# Patient Record
Sex: MALE | Race: WHITE
[De-identification: names, ages, dates, MRNs, and addresses within clinical notes are randomized per-mention and may not be internally consistent; named-entity substitution may affect disease eponyms.]

---

## 2017-06-14 ENCOUNTER — APPOINTMENT (OUTPATIENT)
Dept: DERMATOLOGY | Facility: CLINIC | Age: 62
End: 2017-06-14

## 2017-06-14 VITALS — HEIGHT: 68 IN | WEIGHT: 185 LBS | BODY MASS INDEX: 28.04 KG/M2

## 2017-06-14 DIAGNOSIS — Z86.69 PERSONAL HISTORY OF OTHER DISEASES OF THE NERVOUS SYSTEM AND SENSE ORGANS: ICD-10-CM

## 2017-06-14 DIAGNOSIS — Z86.79 PERSONAL HISTORY OF OTHER DISEASES OF THE CIRCULATORY SYSTEM: ICD-10-CM

## 2017-06-14 RX ORDER — METOPROLOL TARTRATE 100 MG/1
100 TABLET, FILM COATED ORAL
Refills: 0 | Status: ACTIVE | COMMUNITY

## 2017-06-22 ENCOUNTER — EMERGENCY (EMERGENCY)
Facility: HOSPITAL | Age: 62
LOS: 0 days | Discharge: HOME | End: 2017-06-22
Attending: EMERGENCY MEDICINE | Admitting: EMERGENCY MEDICINE
Payer: COMMERCIAL

## 2017-06-22 VITALS
TEMPERATURE: 98 F | SYSTOLIC BLOOD PRESSURE: 117 MMHG | DIASTOLIC BLOOD PRESSURE: 85 MMHG | HEART RATE: 17 BPM | OXYGEN SATURATION: 100 % | RESPIRATION RATE: 17 BRPM

## 2017-06-22 VITALS — HEIGHT: 68 IN | WEIGHT: 184.97 LBS

## 2017-06-22 DIAGNOSIS — R07.9 CHEST PAIN, UNSPECIFIED: ICD-10-CM

## 2017-06-22 DIAGNOSIS — R07.89 OTHER CHEST PAIN: ICD-10-CM

## 2017-06-22 DIAGNOSIS — I48.91 UNSPECIFIED ATRIAL FIBRILLATION: ICD-10-CM

## 2017-06-22 DIAGNOSIS — Z79.82 LONG TERM (CURRENT) USE OF ASPIRIN: ICD-10-CM

## 2017-06-22 LAB
ALBUMIN SERPL ELPH-MCNC: 3.5 G/DL — SIGNIFICANT CHANGE UP (ref 3.3–5)
ALP SERPL-CCNC: 112 U/L — SIGNIFICANT CHANGE UP (ref 40–120)
ALT FLD-CCNC: 36 U/L — SIGNIFICANT CHANGE UP (ref 12–78)
ANION GAP SERPL CALC-SCNC: 6 MMOL/L — SIGNIFICANT CHANGE UP (ref 5–17)
ANISOCYTOSIS BLD QL: SLIGHT — SIGNIFICANT CHANGE UP
AST SERPL-CCNC: 33 U/L — SIGNIFICANT CHANGE UP (ref 15–37)
BASO STIPL BLD QL SMEAR: SLIGHT — SIGNIFICANT CHANGE UP
BASOPHILS # BLD AUTO: 0.1 K/UL — SIGNIFICANT CHANGE UP (ref 0–0.2)
BASOPHILS NFR BLD AUTO: 0.7 % — SIGNIFICANT CHANGE UP (ref 0–2)
BILIRUB SERPL-MCNC: 1 MG/DL — SIGNIFICANT CHANGE UP (ref 0.2–1.2)
BUN SERPL-MCNC: 25 MG/DL — HIGH (ref 7–23)
CALCIUM SERPL-MCNC: 8.3 MG/DL — LOW (ref 8.5–10.1)
CHLORIDE SERPL-SCNC: 109 MMOL/L — HIGH (ref 96–108)
CO2 SERPL-SCNC: 26 MMOL/L — SIGNIFICANT CHANGE UP (ref 22–31)
CREAT SERPL-MCNC: 0.98 MG/DL — SIGNIFICANT CHANGE UP (ref 0.5–1.3)
DACRYOCYTES BLD QL SMEAR: SIGNIFICANT CHANGE UP
ELLIPTOCYTES BLD QL SMEAR: SIGNIFICANT CHANGE UP
EOSINOPHIL # BLD AUTO: 0.5 K/UL — SIGNIFICANT CHANGE UP (ref 0–0.5)
EOSINOPHIL NFR BLD AUTO: 4.1 % — SIGNIFICANT CHANGE UP (ref 0–6)
GLUCOSE SERPL-MCNC: 96 MG/DL — SIGNIFICANT CHANGE UP (ref 70–99)
HCT VFR BLD CALC: 37.4 % — LOW (ref 39–50)
HGB BLD-MCNC: 11.9 G/DL — LOW (ref 13–17)
HYPOCHROMIA BLD QL: SLIGHT — SIGNIFICANT CHANGE UP
LYMPHOCYTES # BLD AUTO: 2.4 K/UL — SIGNIFICANT CHANGE UP (ref 1–3.3)
LYMPHOCYTES # BLD AUTO: 22 % — SIGNIFICANT CHANGE UP (ref 13–44)
MANUAL DIF COMMENT BLD-IMP: SIGNIFICANT CHANGE UP
MCHC RBC-ENTMCNC: 20.4 PG — LOW (ref 27–34)
MCHC RBC-ENTMCNC: 31.8 GM/DL — LOW (ref 32–36)
MCV RBC AUTO: 64.1 FL — LOW (ref 80–100)
MICROCYTES BLD QL: SIGNIFICANT CHANGE UP
MONOCYTES # BLD AUTO: 1.2 K/UL — HIGH (ref 0–0.9)
MONOCYTES NFR BLD AUTO: 10.4 % — SIGNIFICANT CHANGE UP (ref 2–14)
NEUTROPHILS # BLD AUTO: 6.9 K/UL — SIGNIFICANT CHANGE UP (ref 1.8–7.4)
NEUTROPHILS NFR BLD AUTO: 62.7 % — SIGNIFICANT CHANGE UP (ref 43–77)
OVALOCYTES BLD QL SMEAR: SLIGHT — SIGNIFICANT CHANGE UP
PLAT MORPH BLD: NORMAL — SIGNIFICANT CHANGE UP
PLATELET # BLD AUTO: 158 K/UL — SIGNIFICANT CHANGE UP (ref 150–400)
POIKILOCYTOSIS BLD QL AUTO: SIGNIFICANT CHANGE UP
POTASSIUM SERPL-MCNC: 3.7 MMOL/L — SIGNIFICANT CHANGE UP (ref 3.5–5.3)
POTASSIUM SERPL-SCNC: 3.7 MMOL/L — SIGNIFICANT CHANGE UP (ref 3.5–5.3)
PROT SERPL-MCNC: 6.3 GM/DL — SIGNIFICANT CHANGE UP (ref 6–8.3)
RBC # BLD: 5.84 M/UL — HIGH (ref 4.2–5.8)
RBC # FLD: 14 % — SIGNIFICANT CHANGE UP (ref 10.3–14.5)
RBC BLD AUTO: (no result)
SODIUM SERPL-SCNC: 141 MMOL/L — SIGNIFICANT CHANGE UP (ref 135–145)
TROPONIN I SERPL-MCNC: <0.015 NG/ML — SIGNIFICANT CHANGE UP (ref 0.01–0.04)
TROPONIN I SERPL-MCNC: <0.015 NG/ML — SIGNIFICANT CHANGE UP (ref 0.01–0.04)
WBC # BLD: 11.1 K/UL — HIGH (ref 3.8–10.5)
WBC # FLD AUTO: 11.1 K/UL — HIGH (ref 3.8–10.5)

## 2017-06-22 PROCEDURE — 93010 ELECTROCARDIOGRAM REPORT: CPT

## 2017-06-22 PROCEDURE — 71020: CPT | Mod: 26

## 2017-06-22 PROCEDURE — 99285 EMERGENCY DEPT VISIT HI MDM: CPT

## 2017-06-22 RX ORDER — SODIUM CHLORIDE 9 MG/ML
3 INJECTION INTRAMUSCULAR; INTRAVENOUS; SUBCUTANEOUS ONCE
Qty: 0 | Refills: 0 | Status: COMPLETED | OUTPATIENT
Start: 2017-06-22 | End: 2017-06-22

## 2017-06-22 RX ADMIN — SODIUM CHLORIDE 3 MILLILITER(S): 9 INJECTION INTRAMUSCULAR; INTRAVENOUS; SUBCUTANEOUS at 09:33

## 2017-06-22 NOTE — ED PROVIDER NOTE - OBJECTIVE STATEMENT
63 y/o male with PMHx of Afib, on ASA presents to the ED c/o B/L arm numbness and CP starting this morning. Pt was at work when CP started and he started getting diaphoretic and lightheaded. Pt states sx lasted a couple of seconds and has occurred several times. Pt states that he felt the blood wasn't going to his heart and straight to his brain. Pt states that last time this happened, he came to the ED and was told he had Afib. Pt was placed on Coumadin but has been noncompliant. Pt is currently asymptomatic. Denies SOB. No recent travel. Dr. Alvarez, cardio. 63 y/o male with PMHx of Afib, on ASA presents to the ED c/o B/L arm numbness and CP starting this morning. Pt was at work when CP started and he started getting diaphoretic and lightheaded. Pt states sx lasted a couple of seconds and has occurred several times. Pt states that he felt the blood wasn't going to his heart and straight to his brain. Pt states that last time this happened, he came to the ED and was told he had Afib. Pt was placed on Coumadin but has been noncompliant. Pt has not seen cardiologist in 4 years. Pt is currently asymptomatic. Denies SOB. No recent travel. Dr. Alvarez, cardio. 61 y/o male with PMHx of Afib, on ASA presents to the ED c/o B/L arm numbness and CP starting this morning. Pt was at work when CP started and he started getting diaphoretic and lightheaded. Pt states sx lasted a couple of seconds and has occurred several times intermittently over a few minutes lasting seconds. Pt states that he felt the blood wasn't going to his heart and straight to his brain. Pt states that last time this happened, he came to the ED and was told he had Afib. Pt was placed on Coumadin but has been noncompliant. Pt has not seen cardiologist in 4 years. Pt is currently asymptomatic. Denies SOB. No recent travel. Dr. Alvarez, cardio.

## 2017-06-22 NOTE — SBIRT NOTE. - NSSBIRTSERVICES_GEN_A_ED_FT
Provided SBIRT services: Full screen Negative. Positive reinforcement provided given patient currently within healthy guidelines. Education materials reviewed and given to patient.  AUDIT Score:  2  DAST Score:  0  Duration=  10 Minutes

## 2017-06-22 NOTE — ED PROVIDER NOTE - PROGRESS NOTE DETAILS
Lucero Weinberg on behalf of Attending Dr. [place]. Pt updated on lab results. Pt currently feels fine. Will d/c and f/u with cardiologist.

## 2017-06-22 NOTE — ED PROVIDER NOTE - MEDICAL DECISION MAKING DETAILS
here with seconds long intermittent CP with some LH this morning.  feels well now, no recurrence of symptoms here in the ER.  VSS, labs WNL, EKG WNL, NSR.  needs prompt cardiology followup and pt understands.  pt has stopped his coumadin and states he no longer wants to take it.  he should take at least a full ASA every day until he follows up with cardiology or PMD tomorrow do perhaps choose another anticoagulant for him.  again, pt expresses understanding

## 2017-07-14 ENCOUNTER — APPOINTMENT (OUTPATIENT)
Dept: DERMATOLOGY | Facility: CLINIC | Age: 62
End: 2017-07-14

## 2017-07-14 DIAGNOSIS — L70.0 ACNE VULGARIS: ICD-10-CM

## 2017-09-06 ENCOUNTER — APPOINTMENT (OUTPATIENT)
Dept: DERMATOLOGY | Facility: CLINIC | Age: 62
End: 2017-09-06
Payer: COMMERCIAL

## 2017-09-06 DIAGNOSIS — L30.1 DYSHIDROSIS [POMPHOLYX]: ICD-10-CM

## 2017-09-06 PROCEDURE — 99213 OFFICE O/P EST LOW 20 MIN: CPT

## 2017-10-24 ENCOUNTER — OUTPATIENT (OUTPATIENT)
Dept: OUTPATIENT SERVICES | Facility: HOSPITAL | Age: 62
LOS: 1 days | End: 2017-10-24
Payer: COMMERCIAL

## 2017-10-24 VITALS
HEART RATE: 72 BPM | OXYGEN SATURATION: 98 % | RESPIRATION RATE: 18 BRPM | TEMPERATURE: 97 F | DIASTOLIC BLOOD PRESSURE: 80 MMHG | SYSTOLIC BLOOD PRESSURE: 132 MMHG | WEIGHT: 184.97 LBS | HEIGHT: 68 IN

## 2017-10-24 VITALS — HEIGHT: 68 IN | WEIGHT: 184.97 LBS

## 2017-10-24 DIAGNOSIS — H26.9 UNSPECIFIED CATARACT: Chronic | ICD-10-CM

## 2017-10-24 DIAGNOSIS — Z98.890 OTHER SPECIFIED POSTPROCEDURAL STATES: Chronic | ICD-10-CM

## 2017-10-24 DIAGNOSIS — I48.91 UNSPECIFIED ATRIAL FIBRILLATION: ICD-10-CM

## 2017-10-24 DIAGNOSIS — Z01.810 ENCOUNTER FOR PREPROCEDURAL CARDIOVASCULAR EXAMINATION: ICD-10-CM

## 2017-10-24 LAB
ANION GAP SERPL CALC-SCNC: 10 MMOL/L — SIGNIFICANT CHANGE UP (ref 5–17)
APTT BLD: 27.6 SEC — SIGNIFICANT CHANGE UP (ref 27.5–37.4)
BLD GP AB SCN SERPL QL: SIGNIFICANT CHANGE UP
BUN SERPL-MCNC: 14 MG/DL — SIGNIFICANT CHANGE UP (ref 8–20)
CALCIUM SERPL-MCNC: 8.7 MG/DL — SIGNIFICANT CHANGE UP (ref 8.6–10.2)
CHLORIDE SERPL-SCNC: 102 MMOL/L — SIGNIFICANT CHANGE UP (ref 98–107)
CO2 SERPL-SCNC: 28 MMOL/L — SIGNIFICANT CHANGE UP (ref 22–29)
CREAT SERPL-MCNC: 0.86 MG/DL — SIGNIFICANT CHANGE UP (ref 0.5–1.3)
GLUCOSE SERPL-MCNC: 93 MG/DL — SIGNIFICANT CHANGE UP (ref 70–115)
HCT VFR BLD CALC: 38.4 % — LOW (ref 42–52)
HGB BLD-MCNC: 12.9 G/DL — LOW (ref 14–18)
INR BLD: 1.06 RATIO — SIGNIFICANT CHANGE UP (ref 0.88–1.16)
MCHC RBC-ENTMCNC: 19.4 PG — LOW (ref 27–31)
MCHC RBC-ENTMCNC: 33.6 G/DL — SIGNIFICANT CHANGE UP (ref 32–36)
MCV RBC AUTO: 57.7 FL — LOW (ref 80–94)
PLATELET # BLD AUTO: 212 K/UL — SIGNIFICANT CHANGE UP (ref 150–400)
POTASSIUM SERPL-MCNC: 4.5 MMOL/L — SIGNIFICANT CHANGE UP (ref 3.5–5.3)
POTASSIUM SERPL-SCNC: 4.5 MMOL/L — SIGNIFICANT CHANGE UP (ref 3.5–5.3)
PROTHROM AB SERPL-ACNC: 11.7 SEC — SIGNIFICANT CHANGE UP (ref 9.8–12.7)
RBC # BLD: 6.65 M/UL — HIGH (ref 4.6–6.2)
RBC # FLD: 16.1 % — HIGH (ref 11–15.6)
SODIUM SERPL-SCNC: 140 MMOL/L — SIGNIFICANT CHANGE UP (ref 135–145)
TYPE + AB SCN PNL BLD: SIGNIFICANT CHANGE UP
WBC # BLD: 12.3 K/UL — HIGH (ref 4.8–10.8)
WBC # FLD AUTO: 12.3 K/UL — HIGH (ref 4.8–10.8)

## 2017-10-24 PROCEDURE — 75574 CT ANGIO HRT W/3D IMAGE: CPT

## 2017-10-24 PROCEDURE — 93010 ELECTROCARDIOGRAM REPORT: CPT

## 2017-10-24 PROCEDURE — 75574 CT ANGIO HRT W/3D IMAGE: CPT | Mod: 26

## 2017-10-24 NOTE — H&P PST ADULT - ATTENDING COMMENTS
Pt. seen and examined with no change in clinical status since PST or MANDO yesterday.  MANDO revealed no SHANAE thrombus and PFO

## 2017-10-24 NOTE — H&P PST ADULT - ASSESSMENT
62 year old male patient with a history of hypertension and paroxysmal atrial fibrillation, CHADSVASC: 1 who presents for elective ablation.     - NPO post midnight day of the procedure  - MANDO in the office on day prior to ablation.   - Lovenox injection will be given in Dr. Dudley's office on day of MANDO  - CTA ordered and will be performed. 62 year old male patient with a history of hypertension and paroxysmal atrial fibrillation, CHADSVASC: 1 who presents for elective ablation.     - NPO post midnight day of the procedure  - MANDO in the office on day prior to ablation.   - Lovenox injection will be given in Dr. Dudley's office on day of MANDO  - CTA ordered and will be performed today. 62 year old male patient with a history of hypertension and paroxysmal atrial fibrillation, CHADSVASC: 1 who presents for elective ablation.     - NPO post midnight day of the procedure  - MANDO in the office on day prior to ablation.   - Lovenox injection will be given in Dr. Dudley's office on day of MANDO  - CTA ordered and will be performed today.     **WBC elevated on PST. Will repeat CBC day of procedure**

## 2017-10-24 NOTE — H&P PST ADULT - HISTORY OF PRESENT ILLNESS
62 year old male patient with a history of hypertension, and very symptomatic atrial fibrillation. He reports his palpitations have been occurring sporadically but lately have gotten much worse and are happening daily. He also says that his medication dosage keeps getting larger as a result of this.      Echo: 7/25/17: EF 67%, LA 3.6cm 62 year old male patient with a history of hypertension, and very symptomatic atrial fibrillation. He reports his palpitations have been occurring sporadically but lately have gotten much worse and are happening daily. He also says that his medication dosage keeps getting larger as a result of this. Onset of symptoms was around 6 years ago while at work. Symptoms have gotten worse around one month ago.      Echo: 7/25/17: EF 67%, LA 3.6cm

## 2017-10-26 ENCOUNTER — INPATIENT (INPATIENT)
Facility: HOSPITAL | Age: 62
LOS: 0 days | Discharge: HOME | DRG: 274 | End: 2017-10-27
Attending: INTERNAL MEDICINE | Admitting: INTERNAL MEDICINE
Payer: COMMERCIAL

## 2017-10-26 ENCOUNTER — TRANSCRIPTION ENCOUNTER (OUTPATIENT)
Age: 62
End: 2017-10-26

## 2017-10-26 VITALS
TEMPERATURE: 98 F | RESPIRATION RATE: 16 BRPM | DIASTOLIC BLOOD PRESSURE: 79 MMHG | SYSTOLIC BLOOD PRESSURE: 149 MMHG | HEART RATE: 72 BPM | OXYGEN SATURATION: 98 %

## 2017-10-26 DIAGNOSIS — Z98.890 OTHER SPECIFIED POSTPROCEDURAL STATES: Chronic | ICD-10-CM

## 2017-10-26 DIAGNOSIS — I48.1 PERSISTENT ATRIAL FIBRILLATION: ICD-10-CM

## 2017-10-26 DIAGNOSIS — H26.9 UNSPECIFIED CATARACT: Chronic | ICD-10-CM

## 2017-10-26 DIAGNOSIS — Z01.810 ENCOUNTER FOR PREPROCEDURAL CARDIOVASCULAR EXAMINATION: ICD-10-CM

## 2017-10-26 LAB
ABO RH CONFIRMATION: SIGNIFICANT CHANGE UP
HCT VFR BLD CALC: 37.8 % — LOW (ref 42–52)
HGB BLD-MCNC: 12.6 G/DL — LOW (ref 14–18)
MCHC RBC-ENTMCNC: 19.3 PG — LOW (ref 27–31)
MCHC RBC-ENTMCNC: 33.3 G/DL — SIGNIFICANT CHANGE UP (ref 32–36)
MCV RBC AUTO: 57.8 FL — LOW (ref 80–94)
PLATELET # BLD AUTO: 179 K/UL — SIGNIFICANT CHANGE UP (ref 150–400)
RBC # BLD: 6.54 M/UL — HIGH (ref 4.6–6.2)
RBC # FLD: 16 % — HIGH (ref 11–15.6)
WBC # BLD: 9.7 K/UL — SIGNIFICANT CHANGE UP (ref 4.8–10.8)
WBC # FLD AUTO: 9.7 K/UL — SIGNIFICANT CHANGE UP (ref 4.8–10.8)

## 2017-10-26 PROCEDURE — 85027 COMPLETE CBC AUTOMATED: CPT

## 2017-10-26 PROCEDURE — 86900 BLOOD TYPING SEROLOGIC ABO: CPT

## 2017-10-26 PROCEDURE — 86901 BLOOD TYPING SEROLOGIC RH(D): CPT

## 2017-10-26 PROCEDURE — 86920 COMPATIBILITY TEST SPIN: CPT

## 2017-10-26 PROCEDURE — 85610 PROTHROMBIN TIME: CPT

## 2017-10-26 PROCEDURE — 36415 COLL VENOUS BLD VENIPUNCTURE: CPT

## 2017-10-26 PROCEDURE — 93010 ELECTROCARDIOGRAM REPORT: CPT

## 2017-10-26 PROCEDURE — 93005 ELECTROCARDIOGRAM TRACING: CPT

## 2017-10-26 PROCEDURE — 80048 BASIC METABOLIC PNL TOTAL CA: CPT

## 2017-10-26 PROCEDURE — 86850 RBC ANTIBODY SCREEN: CPT

## 2017-10-26 PROCEDURE — G0463: CPT

## 2017-10-26 PROCEDURE — 85730 THROMBOPLASTIN TIME PARTIAL: CPT

## 2017-10-26 RX ORDER — FENTANYL CITRATE 50 UG/ML
50 INJECTION INTRAVENOUS ONCE
Qty: 0 | Refills: 0 | Status: DISCONTINUED | OUTPATIENT
Start: 2017-10-26 | End: 2017-10-26

## 2017-10-26 RX ORDER — ACETAMINOPHEN 500 MG
650 TABLET ORAL EVERY 6 HOURS
Qty: 0 | Refills: 0 | Status: DISCONTINUED | OUTPATIENT
Start: 2017-10-26 | End: 2017-10-27

## 2017-10-26 RX ORDER — COLCHICINE 0.6 MG
0.6 TABLET ORAL DAILY
Qty: 0 | Refills: 0 | Status: DISCONTINUED | OUTPATIENT
Start: 2017-10-26 | End: 2017-10-27

## 2017-10-26 RX ORDER — METOPROLOL TARTRATE 50 MG
100 TABLET ORAL DAILY
Qty: 0 | Refills: 0 | Status: DISCONTINUED | OUTPATIENT
Start: 2017-10-26 | End: 2017-10-27

## 2017-10-26 RX ORDER — APIXABAN 2.5 MG/1
5 TABLET, FILM COATED ORAL EVERY 12 HOURS
Qty: 0 | Refills: 0 | Status: DISCONTINUED | OUTPATIENT
Start: 2017-10-26 | End: 2017-10-27

## 2017-10-26 RX ORDER — ASPIRIN/CALCIUM CARB/MAGNESIUM 324 MG
81 TABLET ORAL DAILY
Qty: 0 | Refills: 0 | Status: DISCONTINUED | OUTPATIENT
Start: 2017-10-26 | End: 2017-10-27

## 2017-10-26 RX ORDER — OXYCODONE AND ACETAMINOPHEN 5; 325 MG/1; MG/1
2 TABLET ORAL EVERY 6 HOURS
Qty: 0 | Refills: 0 | Status: DISCONTINUED | OUTPATIENT
Start: 2017-10-26 | End: 2017-10-27

## 2017-10-26 RX ORDER — SODIUM CHLORIDE 9 MG/ML
1000 INJECTION INTRAMUSCULAR; INTRAVENOUS; SUBCUTANEOUS
Qty: 0 | Refills: 0 | Status: DISCONTINUED | OUTPATIENT
Start: 2017-10-26 | End: 2017-10-27

## 2017-10-26 RX ORDER — PANTOPRAZOLE SODIUM 20 MG/1
40 TABLET, DELAYED RELEASE ORAL
Qty: 0 | Refills: 0 | Status: DISCONTINUED | OUTPATIENT
Start: 2017-10-26 | End: 2017-10-27

## 2017-10-26 RX ORDER — METOCLOPRAMIDE HCL 10 MG
10 TABLET ORAL EVERY 6 HOURS
Qty: 0 | Refills: 0 | Status: DISCONTINUED | OUTPATIENT
Start: 2017-10-26 | End: 2017-10-27

## 2017-10-26 RX ADMIN — OXYCODONE AND ACETAMINOPHEN 2 TABLET(S): 5; 325 TABLET ORAL at 14:49

## 2017-10-26 RX ADMIN — APIXABAN 5 MILLIGRAM(S): 2.5 TABLET, FILM COATED ORAL at 17:13

## 2017-10-26 RX ADMIN — OXYCODONE AND ACETAMINOPHEN 2 TABLET(S): 5; 325 TABLET ORAL at 13:22

## 2017-10-26 RX ADMIN — FENTANYL CITRATE 50 MICROGRAM(S): 50 INJECTION INTRAVENOUS at 15:45

## 2017-10-26 RX ADMIN — Medication 0.6 MILLIGRAM(S): at 16:56

## 2017-10-26 RX ADMIN — OXYCODONE AND ACETAMINOPHEN 2 TABLET(S): 5; 325 TABLET ORAL at 23:30

## 2017-10-26 RX ADMIN — FENTANYL CITRATE 50 MICROGRAM(S): 50 INJECTION INTRAVENOUS at 16:56

## 2017-10-26 RX ADMIN — Medication 81 MILLIGRAM(S): at 18:26

## 2017-10-26 RX ADMIN — OXYCODONE AND ACETAMINOPHEN 2 TABLET(S): 5; 325 TABLET ORAL at 22:41

## 2017-10-26 NOTE — DISCHARGE NOTE ADULT - HOSPITAL COURSE
62 year old male patient with a history of hypertension and symptomatic paroxysmal atrial fibrillation with episodes recently increasing in frequency and duration (CHADSVASC = 1). He presented electively 10/26/17 and underwent uncomplicated radiofrequency ablation (WACA/PVI, CTI line). The patient was observed overnight without event and was discharged home the following morning with a plan for outpatient follow up.

## 2017-10-26 NOTE — DISCHARGE NOTE ADULT - CARE PROVIDER_API CALL
Adis Dudley), Cardiovascular Disease; Internal Medicine  [address] [place] [address]  Phone: [phone]  Fax: [phone]

## 2017-10-26 NOTE — PROGRESS NOTE ADULT - ASSESSMENT
61 yo male, PMHx HTN, symptomatic paroxysmal atrial fibrillation, now POD #0 s/p afib cryoablation.     PLAN:  - Continuous cardiac monitoring for arrhythmias, monitor for development of chest pain  - Monitor hemodynamic status via arterial line for signs of post-op complications including cardiac tamponade  - Maintain bedrest until stopcock and sutures removed, and venostasis has been achieved  - Continue Eliquis for AC 5mg BID and ASA   - PPI and Colchicine for post-op prophylaxis  - Analgesics PRN, encourage use of non-narcotics as tolerated  - Restarted on home antihypertensive agent  - F/u AM EKG, BMP, Mg, Phos. Replace electrolytes as needed for goal K > 4 and Mg > 2 63 yo male, PMHx HTN, symptomatic paroxysmal atrial fibrillation, POD#0 s/p elective atrial fibrillation ablation    PLAN:  - No active bleeding of groin site at this time, will continue to monitor with serial physical exams now that patient has started on anticoagulation  - Anticoagulation with Eliquis 5mg BID and ASA 81mg QD  - Continuous cardiac monitoring overnight for development of arrhythmias  - Reglan PRN n/v  - PPI and Colchicine for post-procedural prophylaxis  - Analgesics PRN, encourage non-narcotics for pain as tolerated  - Metoprolol ER 100mg QD for hypertension, BP presently controlled, continue to monitor  - Ambien 5mg at patient's request for insomnia  - F/u AM labs and EKG, replace electrolytes as needed with goal Mg > 2 and K > 4

## 2017-10-26 NOTE — PROGRESS NOTE ADULT - SUBJECTIVE AND OBJECTIVE BOX
Patient is a 62y old  Male who presents with a chief complaint of Afib ablation (26 Oct 2017 13:06)      BRIEF HOSPITAL COURSE: 61 yo male, PMHx HTN, symptomatic paroxysmal atrial fibrillation refractory to medical therapy. MANDO prior to ablation negative for SHANAE thrombus. Echo 7/25/17: EF 67%, LA 3.6cm    Events last 24 hours: POD #0 s/p elective AFib cryoablation, with groin stopcock and sutures remain in place. Right rad A-line. Started on anticoagulation with Eliquis. Denies chest pain or shortness of breath. Evaluated in bed, eating dinner.    PAST MEDICAL & SURGICAL HISTORY:  Palpitations  Chest pain  HTN (hypertension)  PAC (premature atrial contraction)  Afib  H/O: knee surgery: bilateral  Cataract      Review of Systems:  CONSTITUTIONAL: No fever, chills, or fatigue  EYES: No eye pain, visual disturbances, or discharge  ENMT:  No difficulty hearing, tinnitus, vertigo; No sinus or throat pain  NECK: No pain or stiffness  RESPIRATORY: No cough, wheezing, chills or hemoptysis; No shortness of breath  CARDIOVASCULAR: No chest pain, palpitations, dizziness, or leg swelling  GASTROINTESTINAL: No abdominal or epigastric pain. No nausea, vomiting, or hematemesis; No diarrhea or constipation. No melena or hematochezia.  GENITOURINARY: No dysuria, frequency, hematuria, or incontinence  NEUROLOGICAL: No headaches, memory loss, loss of strength, numbness, or tremors  SKIN: No itching, burning, rashes, or lesions   MUSCULOSKELETAL: No joint pain or swelling; No muscle, back, or extremity pain  PSYCHIATRIC: No depression, anxiety, mood swings, or difficulty sleeping      Medications:  metoprolol succinate  milliGRAM(s) Oral daily  acetaminophen   Tablet. 650 milliGRAM(s) Oral every 6 hours PRN  metoclopramide Injectable 10 milliGRAM(s) IV Push every 6 hours PRN  oxyCODONE    5 mG/acetaminophen 325 mG 2 Tablet(s) Oral every 6 hours PRN  apixaban 5 milliGRAM(s) Oral every 12 hours  aspirin enteric coated 81 milliGRAM(s) Oral daily  pantoprazole    Tablet 40 milliGRAM(s) Oral before breakfast  colchicine 0.6 milliGRAM(s) Oral daily  sodium chloride 0.9%. 1000 milliLiter(s) IV Continuous <Continuous>      ICU Vital Signs Last 24 Hrs  T(C): 36.7 (26 Oct 2017 07:34), Max: 36.7 (26 Oct 2017 07:34)  T(F): 98 (26 Oct 2017 07:34), Max: 98 (26 Oct 2017 07:34)  HR: 93 (26 Oct 2017 22:39) (72 - 100)  BP: 139/85 (26 Oct 2017 16:50) (121/75 - 149/79)  BP(mean): --  ABP: 141/71 (26 Oct 2017 22:39) (114/69 - 143/80)  ABP(mean): 98 (26 Oct 2017 22:39) (84 - 101)  RR: 15 (26 Oct 2017 22:39) (14 - 20)  SpO2: 95% (26 Oct 2017 22:39) (93% - 99%)          I&O's Detail    26 Oct 2017 07:01  -  26 Oct 2017 22:47  --------------------------------------------------------  IN:  Total IN: 0 mL    OUT:    Indwelling Catheter - Urethral: 750 mL  Total OUT: 750 mL    Total NET: -750 mL            LABS:                        12.6   9.7   )-----------( 179      ( 26 Oct 2017 07:59 )             37.8                 CAPILLARY BLOOD GLUCOSE            CULTURES:      Physical Examination:    General: Well appearing male lying in bed in no acute distress.      HEENT: Pupils equal, reactive to light.  Symmetric.    PULM: Clear to auscultation bilaterally, no significant sputum production    CVS: Regular rate and rhythm, no murmurs, rubs, or gallops    ABD: Soft, nondistended, nontender, normoactive bowel sounds, no masses    EXT: No edema, nontender    SKIN: Warm and well perfused, no rashes noted.    NEURO: Alert, oriented, interactive, nonfocal    RADIOLOGY:   < from: CT Angio Cardiac w/ IV Cont (10.24.17 @ 12:38) >     EXAM:  CT ANGIO HEART W CORONARIES IC                          PROCEDURE DATE:  10/24/2017      INTERPRETATION:  History: 62 year old male with a history of atrial   fibrillation being evaluated for pulmonary vein and left atrial appendage   mapping.    Procedure: Informed consent was obtained from the patient and there were   no complications during the procedure. Utilizing bolus tracking,  80 cc's   of Omnipaque 350 was injected for CT angiography of the chest..  An   additional non gated CT of the heart was immediately performed.  Axial   two dimensional and three-dimensional images were reconstructed using an   analysis workstation. Study quality is satisfactory.    FINDINGS:    VISUALIZED LUNGS: clear:  MEDIASTINUM:  no significant mediastinal adenopathy.  BONES:  grossly unremarkable   AORTA: No thoracic aortic dissection is noted in the visualized thoracic   aorta.  PULMONARY ARTERIES: No pulmonary embolus is noted within the visualized   central pulmonary arteries.  PERICARDIUM:  normal     There is a calcification in the LAD. This study was not tailored to   evaluate the coronary arteries.      LEFT ATRIAL APPENDAGE:  Morphology: windsock  Size of ostium: 2.7 x 1.5cm.  Shape of ostium: oval  Complete opacification : Yes      There is common pulmonary venous anatomy.    The left superior pulmonary vein measures 1.3 x 1.2 cm. The left inferior   pulmonary vein measures 1.6 x 1.0 cm.    The right superior pulmonary vein measures 1.6 x 1.3 cm The inferior    pulmonary vein measures 2.3 x 1.9 cm    The esophagus is directly posterior to the left atrium.    IMPRESSION:     Pulmonary vein anatomy as described    PAULINA SPRAGUE M.D., ATTENDING RADIOLOGIST  This document has been electronically signed. Oct 25 2017  8:32AM        I have spent 34 minutes evaluating and treating this patient, including reviewing charts, labs, imagining studies, and collaborating with interdisciplinary team. Patient is a 62y old  Male who presents with a chief complaint of Afib ablation (26 Oct 2017 13:06)      BRIEF HOSPITAL COURSE: 61 yo male, PMHx HTN, symptomatic paroxysmal atrial fibrillation refractory to medical therapy. MANDO prior to ablation negative for SHANAE thrombus. Echo 7/25/17: EF 67%, LA 3.6cm    Events last 24 hours: POD#0 from AFib ablation. Pt evaluated sitting up in chair out of bed, denies any chest pain, syncope, or shortness of breath. Had sutures and stopcock removed with some oozing from site, denies leg pain, swelling, or paresthesias. Ambulated around unit without difficulty. Started on Xarelto.    PAST MEDICAL & SURGICAL HISTORY:  Palpitations  Chest pain  HTN (hypertension)  PAC (premature atrial contraction)  Afib  H/O: knee surgery: bilateral  Cataract      Review of Systems:  CONSTITUTIONAL: No fever, chills, or fatigue  EYES: No eye pain, visual disturbances, or discharge  ENMT:  No difficulty hearing, tinnitus, vertigo; No sinus or throat pain  NECK: No pain or stiffness  RESPIRATORY: No cough, wheezing, chills or hemoptysis; No shortness of breath  CARDIOVASCULAR: No chest pain, palpitations, dizziness, or leg swelling  GASTROINTESTINAL: No abdominal or epigastric pain. No nausea, vomiting, or hematemesis; No diarrhea or constipation. No melena or hematochezia.  GENITOURINARY: No dysuria, frequency, hematuria, or incontinence  NEUROLOGICAL: No headaches, memory loss, loss of strength, numbness, or tremors  SKIN: No itching, burning, rashes, or lesions   MUSCULOSKELETAL: No joint pain or swelling; No muscle, back, or extremity pain  PSYCHIATRIC: No depression, anxiety, mood swings, or difficulty sleeping      Medications:  metoprolol succinate  milliGRAM(s) Oral daily  acetaminophen   Tablet. 650 milliGRAM(s) Oral every 6 hours PRN  metoclopramide Injectable 10 milliGRAM(s) IV Push every 6 hours PRN  oxyCODONE    5 mG/acetaminophen 325 mG 2 Tablet(s) Oral every 6 hours PRN  zolpidem 5 milliGRAM(s) Oral once  apixaban 5 milliGRAM(s) Oral every 12 hours  aspirin enteric coated 81 milliGRAM(s) Oral daily  pantoprazole    Tablet 40 milliGRAM(s) Oral before breakfast  colchicine 0.6 milliGRAM(s) Oral daily  sodium chloride 0.9%. 1000 milliLiter(s) IV Continuous <Continuous>      ICU Vital Signs Last 24 Hrs  T(C): 36.7 (26 Oct 2017 07:34), Max: 36.7 (26 Oct 2017 07:34)  T(F): 98 (26 Oct 2017 07:34), Max: 98 (26 Oct 2017 07:34)  HR: 93 (26 Oct 2017 23:00) (72 - 100)  BP: 139/85 (26 Oct 2017 16:50) (121/75 - 149/79)  BP(mean): --  ABP: 140/70 (26 Oct 2017 23:00) (114/69 - 143/80)  ABP(mean): 98 (26 Oct 2017 23:00) (84 - 101)  RR: 15 (26 Oct 2017 23:00) (14 - 20)  SpO2: 96% (26 Oct 2017 23:00) (93% - 99%)          I&O's Detail    26 Oct 2017 07:01  -  27 Oct 2017 00:59  --------------------------------------------------------  IN:  Total IN: 0 mL    OUT:    Indwelling Catheter - Urethral: 750 mL  Total OUT: 750 mL    Total NET: -750 mL            LABS:                        12.6   9.7   )-----------( 179      ( 26 Oct 2017 07:59 )             37.8                 CAPILLARY BLOOD GLUCOSE            CULTURES:      Physical Examination:    General: No acute distress.      HEENT: Pupils equal, reactive to light.  Symmetric.    PULM: Clear to auscultation bilaterally, no significant sputum production    CVS: Regular rate and rhythm, no murmurs, rubs, or gallops    ABD: Soft, nondistended, nontender, normoactive bowel sounds, no masses    EXT: No edema, nontender. No groin hematoma    SKIN: Warm and well perfused, no rashes noted.    NEURO: Alert, oriented, interactive, nonfocal      I have spent 20 minutes evaluating and treating this patient, including reviewing charts, labs, imagining studies, and collaborating with interdisciplinary team.

## 2017-10-26 NOTE — DISCHARGE NOTE ADULT - INSTRUCTIONS
Choose lean meats and poultry without skin and prepare them without added saturated and trans fat.  Eat fish at least twice a week. Recent research shows that eating oily fish containing omega-3 fatty acids (for example, salmon, trout and herring) may help lower your risk of death from coronary artery disease.  Select fat-free, 1 percent fat and low-fat dairy products.  Cut back on foods containing partially hydrogenated vegetable oils to reduce trans fat in your diet.   To lower cholesterol, reduce saturated fat to no more than 5 to 6 percent of total calories. For someone eating 2,000 calories a day, that’s about 13 grams of saturated fat.  Cut back on beverages and foods with added sugars.  Choose and prepare foods with little or no salt. To lower blood pressure, aim to eat no more than 2,400 milligrams of sodium per day. Reducing daily intake to 1,500 mg is desirable because it can lower blood pressure even further.  If you drink alcohol, drink in moderation. That means one drink per day if you’re a woman and two drinks  per day if you’re a man.  Follow the American Heart Association recommendations when you eat out, and keep an eye on your portion sizes. remove dressings before showering

## 2017-10-26 NOTE — PROGRESS NOTE ADULT - SUBJECTIVE AND OBJECTIVE BOX
Subjective:  62y  male s/p successful afib ablation with Dr Dudley.     PAST MEDICAL & SURGICAL HISTORY:  Palpitations  Chest pain  HTN (hypertension)  PAC (premature atrial contraction)  Afib  H/O: knee surgery: bilateral  Cataract    No Known Allergies    FAMILY HISTORY: Not applicable    MEDICATIONS  (STANDING):  apixaban 5 milliGRAM(s) Oral every 12 hours  aspirin enteric coated 81 milliGRAM(s) Oral daily  colchicine 0.6 milliGRAM(s) Oral daily  metoprolol succinate  milliGRAM(s) Oral daily  pantoprazole    Tablet 40 milliGRAM(s) Oral before breakfast  sodium chloride 0.9%. 1000 milliLiter(s) (30 mL/Hr) IV Continuous <Continuous>    MEDICATIONS  (PRN):  acetaminophen   Tablet. 650 milliGRAM(s) Oral every 6 hours PRN Moderate Pain (4 - 6)  oxyCODONE    5 mG/acetaminophen 325 mG 2 Tablet(s) Oral every 6 hours PRN Severe Pain (7 - 10)      General: No fatigue, no fevers/chills  Respiratory: No dyspnea, no cough, no wheeze  CV: No chest pain, no palpitations  Abd: No nausea  Neuro: No headache, no dizziness      Objective:  Vital Signs Last 24 Hrs  T(C): 36.7 (26 Oct 2017 07:34), Max: 36.7 (26 Oct 2017 07:34)  T(F): 98 (26 Oct 2017 07:34), Max: 98 (26 Oct 2017 07:34)  HR: 94 (26 Oct 2017 13:45) (72 - 100)  BP: 121/75 (26 Oct 2017 12:10) (121/75 - 149/79)  BP(mean): --  RR: 16 (26 Oct 2017 13:45) (15 - 20)  SpO2: 98% (26 Oct 2017 13:45) (93% - 98%)  NEURO: A & O x 3, no focal neurologic deficits  PULM:  CTA B/L No W/R/R  CARD: RRR, +S1, +S2, No M/R/G  ABD: ND, +BS, NT, no masses  EXT: upper - Warm, pedal edema yes/no, pitting/non-pitting;  lower - Warm, pedal edema yes/no, pitting/non-pitting  PULSES:  R	- ***A line      FEM: DP                          P      Labs:                        12.6   9.7   )-----------( 179      ( 26 Oct 2017 07:59 )             37.8                 A/P:  S/P  Cryo AF ablation The patient underwnt MANDO prior to ablation revealing no intra-atrial thrombus    -  Admit to ICU level bed    -  Bedrest for 6 hours    -  Start anticoagulation with eliquis 5 mg BID    -  Continue prior home medications/ Aspirin 81 mg daily for 1 month     -  Reglan 10 mg IV every 6 hours as needed for nausea/vomiting.   Acetaminophen 650 mg every 6 hours as needed for moderate pain, Percocet 2 tabs every 6 hours prn severe pain.    7. Protonix 40mg daily for 3 months and colchicine 0.6mg daily for 3 months    8. EKG, CBC, BMP, and Mg in AM    9. Follow up with Dr. Dudley  in 2-4 weeks    10. Remove  groin suture in am prior to discharge Subjective:  62y  male s/p successful afib ablation with Dr Dudley.     PAST MEDICAL & SURGICAL HISTORY:  Palpitations  Chest pain  HTN (hypertension)  PAC (premature atrial contraction)  Afib  H/O: knee surgery: bilateral  Cataract    No Known Allergies    FAMILY HISTORY: Not applicable    MEDICATIONS  (STANDING):  apixaban 5 milliGRAM(s) Oral every 12 hours  aspirin enteric coated 81 milliGRAM(s) Oral daily  colchicine 0.6 milliGRAM(s) Oral daily  metoprolol succinate  milliGRAM(s) Oral daily  pantoprazole    Tablet 40 milliGRAM(s) Oral before breakfast  sodium chloride 0.9%. 1000 milliLiter(s) (30 mL/Hr) IV Continuous <Continuous>    MEDICATIONS  (PRN):  acetaminophen   Tablet. 650 milliGRAM(s) Oral every 6 hours PRN Moderate Pain (4 - 6)  oxyCODONE    5 mG/acetaminophen 325 mG 2 Tablet(s) Oral every 6 hours PRN Severe Pain (7 - 10)      General: No fatigue, no fevers/chills  Respiratory: No dyspnea, no cough, no wheeze  CV: No chest pain, no palpitations  Abd: No nausea  Neuro: No headache, no dizziness      Objective:  Vital Signs Last 24 Hrs  T(C): 36.7 (26 Oct 2017 07:34), Max: 36.7 (26 Oct 2017 07:34)  T(F): 98 (26 Oct 2017 07:34), Max: 98 (26 Oct 2017 07:34)  HR: 94 (26 Oct 2017 13:45) (72 - 100)  BP: 121/75 (26 Oct 2017 12:10) (121/75 - 149/79)  BP(mean): --  RR: 16 (26 Oct 2017 13:45) (15 - 20)  SpO2: 98% (26 Oct 2017 13:45) (93% - 98%)  NEURO: A & O x 3, no focal neurologic deficits  PULM:  CTA B/L No W/R/R  CARD: RRR, +S1, +S2, No M/R/G  ABD: ND, +BS, NT, no masses  EXT: upper - Warm, pedal edema yes/no, pitting/non-pitting;  lower - Warm, pedal edema yes/no, pitting/non-pitting  PULSES:  R radial A line      FEM: DP   pulses palable                             Labs:                        12.6   9.7   )-----------( 179      ( 26 Oct 2017 07:59 )             37.8                 A/P:  S/P  Cryo AF ablation The patient underwent MANDO prior to ablation revealing no intra-atrial thrombus    -  Admit to ICU level bed    -  Bedrest for 6 hours    -  Start anticoagulation with eliquis 5 mg BID    -  Continue prior home medications/ Aspirin 81 mg daily for 1 month     -  Reglan 10 mg IV every 6 hours as needed for nausea/vomiting.   Acetaminophen 650 mg every 6 hours as needed for moderate pain, Percocet 2 tabs every 6 hours prn severe pain.    7. Protonix 40mg daily for 3 months and colchicine 0.6mg daily for 3 months    8. EKG, CBC, BMP, and Mg in AM    9. Follow up with Dr. Dudley  in 2-4 weeks    10. Remove  groin suture in am prior to discharge Subjective:  62y  male s/p successful afib ablation with Dr Dudley.     PAST MEDICAL & SURGICAL HISTORY:  Palpitations  Chest pain  HTN (hypertension)  PAC (premature atrial contraction)  Afib  H/O: knee surgery: bilateral  Cataract    No Known Allergies    FAMILY HISTORY: Not applicable    MEDICATIONS  (STANDING):  apixaban 5 milliGRAM(s) Oral every 12 hours  aspirin enteric coated 81 milliGRAM(s) Oral daily  colchicine 0.6 milliGRAM(s) Oral daily  metoprolol succinate  milliGRAM(s) Oral daily  pantoprazole    Tablet 40 milliGRAM(s) Oral before breakfast  sodium chloride 0.9%. 1000 milliLiter(s) (30 mL/Hr) IV Continuous <Continuous>    MEDICATIONS  (PRN):  acetaminophen   Tablet. 650 milliGRAM(s) Oral every 6 hours PRN Moderate Pain (4 - 6)  oxyCODONE    5 mG/acetaminophen 325 mG 2 Tablet(s) Oral every 6 hours PRN Severe Pain (7 - 10)      General: No fatigue, no fevers/chills  Respiratory: No dyspnea, no cough, no wheeze  CV: No chest pain, no palpitations  Abd: No nausea  Neuro: No headache, no dizziness      Objective:  Vital Signs Last 24 Hrs  T(C): 36.7 (26 Oct 2017 07:34), Max: 36.7 (26 Oct 2017 07:34)  T(F): 98 (26 Oct 2017 07:34), Max: 98 (26 Oct 2017 07:34)  HR: 94 (26 Oct 2017 13:45) (72 - 100)  BP: 121/75 (26 Oct 2017 12:10) (121/75 - 149/79)  BP(mean): --  RR: 16 (26 Oct 2017 13:45) (15 - 20)  SpO2: 98% (26 Oct 2017 13:45) (93% - 98%)  NEURO: A & O x 3, no focal neurologic deficits  PULM:  CTA B/L No W/R/R  CARD: RRR, +S1, +S2, No M/R/G  ABD: ND, +BS, NT, no masses  EXT: upper - Warm, pedal edema yes/no, pitting/non-pitting;  lower - Warm, pedal edema yes/no, pitting/non-pitting  PULSES:  R radial A line      FEM: DP   pulses palable                             Labs:                        12.6   9.7   )-----------( 179      ( 26 Oct 2017 07:59 )             37.8   EKG post procedure NSR nl axis no acute ST changes q wave inferior leads no change from prior EKG 10/24/17 10:36      Report given to PA MICU at 1445              A/P:  S/P  Cryo AF ablation The patient underwent MANDO prior to ablation revealing no intra-atrial thrombus    -  Admit to ICU level bed    -  Bedrest for 6 hours    -  Start anticoagulation with eliquis 5 mg BID    -  Continue prior home medications/ Aspirin 81 mg daily for 1 month     -  Reglan 10 mg IV every 6 hours as needed for nausea/vomiting.   Acetaminophen 650 mg every 6 hours as needed for moderate pain, Percocet 2 tabs every 6 hours prn severe pain.    7. Protonix 40mg daily for 3 months and colchicine 0.6mg daily for 3 months    8. EKG, CBC, BMP, and Mg in AM    9. Follow up with Dr. Dudley  in 2-4 weeks    10. Remove  groin suture in am prior to discharge

## 2017-10-26 NOTE — DISCHARGE NOTE ADULT - NS AS ACTIVITY OBS
No Heavy lifting/straining/Walking-Outdoors allowed/Stairs allowed/Do not make important decisions/Do not drive or operate machinery/Sex allowed/Showering allowed/Walking-Indoors allowed

## 2017-10-26 NOTE — DISCHARGE NOTE ADULT - PATIENT PORTAL LINK FT
“You can access the [employer] Patient Portal, offered by [facility], by registering with the following website: [email or web address]”

## 2017-10-26 NOTE — DISCHARGE NOTE ADULT - MEDICATION SUMMARY - MEDICATIONS TO TAKE
I will START or STAY ON the medications listed below when I get home from the hospital:    Aspir 81 oral delayed release tablet  -- 1 tab(s) by mouth once a day  -- Indication: For blood clot ("thrombus") prevention    acetaminophen 325 mg oral tablet  -- 2 tab(s) by mouth every 6 hours, As needed, Moderate Pain (4 - 6)  -- Indication: For Post procedure pain - as needed    apixaban 5 mg oral tablet  -- 1 tab(s) by mouth 2 times a day   -- Indication: For stroke prevention    colchicine 0.6 mg oral tablet  -- 1 tab(s) by mouth once a day x 90 days, then STOP.   -- Do not take this drug if you are pregnant.  It is very important that you take or use this exactly as directed.  Do not skip doses or discontinue unless directed by your doctor.    -- Indication: For anti-inflammatory post ablation    metoprolol succinate 100 mg oral tablet, extended release  -- 1 tab(s) by mouth once a day  -- Indication: For high blood pressure ("hypertension") and heart rate management    pantoprazole 40 mg oral delayed release tablet  -- 1 tab(s) by mouth once a day (before a meal) x 90 days post ablation, then STOP.   -- Indication: For esophageal inflammation post ablation

## 2017-10-27 VITALS
HEART RATE: 92 BPM | RESPIRATION RATE: 16 BRPM | OXYGEN SATURATION: 97 % | DIASTOLIC BLOOD PRESSURE: 74 MMHG | SYSTOLIC BLOOD PRESSURE: 134 MMHG

## 2017-10-27 LAB
ANION GAP SERPL CALC-SCNC: 14 MMOL/L — SIGNIFICANT CHANGE UP (ref 5–17)
BUN SERPL-MCNC: 15 MG/DL — SIGNIFICANT CHANGE UP (ref 8–20)
CALCIUM SERPL-MCNC: 8.5 MG/DL — LOW (ref 8.6–10.2)
CHLORIDE SERPL-SCNC: 99 MMOL/L — SIGNIFICANT CHANGE UP (ref 98–107)
CO2 SERPL-SCNC: 24 MMOL/L — SIGNIFICANT CHANGE UP (ref 22–29)
CREAT SERPL-MCNC: 0.76 MG/DL — SIGNIFICANT CHANGE UP (ref 0.5–1.3)
GLUCOSE SERPL-MCNC: 152 MG/DL — HIGH (ref 70–115)
HCT VFR BLD CALC: 35.1 % — LOW (ref 42–52)
HGB BLD-MCNC: 12 G/DL — LOW (ref 14–18)
MAGNESIUM SERPL-MCNC: 1.7 MG/DL — SIGNIFICANT CHANGE UP (ref 1.6–2.6)
MCHC RBC-ENTMCNC: 19.7 PG — LOW (ref 27–31)
MCHC RBC-ENTMCNC: 34.2 G/DL — SIGNIFICANT CHANGE UP (ref 32–36)
MCV RBC AUTO: 57.5 FL — LOW (ref 80–94)
PLATELET # BLD AUTO: 171 K/UL — SIGNIFICANT CHANGE UP (ref 150–400)
POTASSIUM SERPL-MCNC: 3.8 MMOL/L — SIGNIFICANT CHANGE UP (ref 3.5–5.3)
POTASSIUM SERPL-SCNC: 3.8 MMOL/L — SIGNIFICANT CHANGE UP (ref 3.5–5.3)
RBC # BLD: 6.1 M/UL — SIGNIFICANT CHANGE UP (ref 4.6–6.2)
RBC # FLD: 16 % — HIGH (ref 11–15.6)
SODIUM SERPL-SCNC: 137 MMOL/L — SIGNIFICANT CHANGE UP (ref 135–145)
WBC # BLD: 19.7 K/UL — HIGH (ref 4.8–10.8)
WBC # FLD AUTO: 19.7 K/UL — HIGH (ref 4.8–10.8)

## 2017-10-27 PROCEDURE — 85027 COMPLETE CBC AUTOMATED: CPT

## 2017-10-27 PROCEDURE — 36415 COLL VENOUS BLD VENIPUNCTURE: CPT

## 2017-10-27 PROCEDURE — C1731: CPT

## 2017-10-27 PROCEDURE — 93623 PRGRMD STIMJ&PACG IV RX NFS: CPT

## 2017-10-27 PROCEDURE — C1730: CPT

## 2017-10-27 PROCEDURE — 83735 ASSAY OF MAGNESIUM: CPT

## 2017-10-27 PROCEDURE — C1894: CPT

## 2017-10-27 PROCEDURE — C1893: CPT

## 2017-10-27 PROCEDURE — 93010 ELECTROCARDIOGRAM REPORT: CPT

## 2017-10-27 PROCEDURE — 93005 ELECTROCARDIOGRAM TRACING: CPT

## 2017-10-27 PROCEDURE — 93613 INTRACARDIAC EPHYS 3D MAPG: CPT

## 2017-10-27 PROCEDURE — C1766: CPT

## 2017-10-27 PROCEDURE — 80048 BASIC METABOLIC PNL TOTAL CA: CPT

## 2017-10-27 PROCEDURE — 93656 COMPRE EP EVAL ABLTJ ATR FIB: CPT

## 2017-10-27 PROCEDURE — 93662 INTRACARDIAC ECG (ICE): CPT

## 2017-10-27 RX ORDER — APIXABAN 2.5 MG/1
1 TABLET, FILM COATED ORAL
Qty: 60 | Refills: 2 | OUTPATIENT
Start: 2017-10-27 | End: 2018-01-24

## 2017-10-27 RX ORDER — MAGNESIUM SULFATE 500 MG/ML
2 VIAL (ML) INJECTION ONCE
Qty: 0 | Refills: 0 | Status: COMPLETED | OUTPATIENT
Start: 2017-10-27 | End: 2017-10-27

## 2017-10-27 RX ORDER — ACETAMINOPHEN 500 MG
2 TABLET ORAL
Qty: 0 | Refills: 0 | COMMUNITY
Start: 2017-10-27

## 2017-10-27 RX ORDER — ZOLPIDEM TARTRATE 10 MG/1
5 TABLET ORAL ONCE
Qty: 0 | Refills: 0 | Status: DISCONTINUED | OUTPATIENT
Start: 2017-10-27 | End: 2017-10-27

## 2017-10-27 RX ORDER — POTASSIUM CHLORIDE 20 MEQ
40 PACKET (EA) ORAL ONCE
Qty: 0 | Refills: 0 | Status: COMPLETED | OUTPATIENT
Start: 2017-10-27 | End: 2017-10-27

## 2017-10-27 RX ORDER — COLCHICINE 0.6 MG
1 TABLET ORAL
Qty: 90 | Refills: 0 | OUTPATIENT
Start: 2017-10-27 | End: 2018-01-25

## 2017-10-27 RX ORDER — PANTOPRAZOLE SODIUM 20 MG/1
1 TABLET, DELAYED RELEASE ORAL
Qty: 90 | Refills: 0 | OUTPATIENT
Start: 2017-10-27 | End: 2018-01-25

## 2017-10-27 RX ADMIN — PANTOPRAZOLE SODIUM 40 MILLIGRAM(S): 20 TABLET, DELAYED RELEASE ORAL at 06:45

## 2017-10-27 RX ADMIN — APIXABAN 5 MILLIGRAM(S): 2.5 TABLET, FILM COATED ORAL at 06:45

## 2017-10-27 RX ADMIN — Medication 40 MILLIEQUIVALENT(S): at 08:40

## 2017-10-27 RX ADMIN — Medication 50 GRAM(S): at 08:39

## 2017-10-27 RX ADMIN — ZOLPIDEM TARTRATE 5 MILLIGRAM(S): 10 TABLET ORAL at 01:00

## 2017-10-27 RX ADMIN — Medication 100 MILLIGRAM(S): at 06:45

## 2017-10-27 NOTE — PROGRESS NOTE ADULT - SUBJECTIVE AND OBJECTIVE BOX
Pt doing well POD #1 s/p AF RF ablation ([facility]/PVI, CTI line). Denies complaint.     EKG: sinus rhythm w/ intact conduction; normal axis & intervals; no acute changes  TELE: sinus rhythm w/ intact conduction; no acute changes or arrhythmias.     MEDICATIONS  (STANDING):  apixaban 5 milliGRAM(s) Oral every 12 hours  aspirin enteric coated 81 milliGRAM(s) Oral daily  colchicine 0.6 milliGRAM(s) Oral daily  magnesium sulfate  IVPB 2 Gram(s) IV Intermittent once  metoprolol succinate  milliGRAM(s) Oral daily  pantoprazole    Tablet 40 milliGRAM(s) Oral before breakfast  potassium chloride   Powder 40 milliEquivalent(s) Oral once  sodium chloride 0.9%. 1000 milliLiter(s) (30 mL/Hr) IV Continuous <Continuous>    MEDICATIONS  (PRN):  acetaminophen   Tablet. 650 milliGRAM(s) Oral every 6 hours PRN Moderate Pain (4 - 6)  metoclopramide Injectable 10 milliGRAM(s) IV Push every 6 hours PRN nausea and vomiting  oxyCODONE    5 mG/acetaminophen 325 mG 2 Tablet(s) Oral every 6 hours PRN Severe Pain (7 - 10)      Allergies  No Known Allergies      PAST MEDICAL & SURGICAL HISTORY:  Palpitations  Chest pain  HTN (hypertension)  PAC (premature atrial contraction)  Afib  H/O: knee surgery: bilateral  Cataract      Vital Signs Last 24 Hrs  T(C): 36.5 (27 Oct 2017 06:01), Max: 36.6 (27 Oct 2017 00:00)  T(F): 97.7 (27 Oct 2017 06:01), Max: 97.8 (27 Oct 2017 00:00)  HR: 92 (27 Oct 2017 06:41) (90 - 100)  BP: 138/79 (27 Oct 2017 06:41) (121/75 - 148/68)  BP(mean): 102 (27 Oct 2017 06:41) (102 - 102)  RR: 15 (27 Oct 2017 06:41) (14 - 20)  SpO2: 96% (27 Oct 2017 06:41) (93% - 99%)    Physical Exam:  Constitutional: NAD, AAOx3  Cardiovascular: +S1S2 RRR  Pulmonary: CTA b/l, unlabored  Abd: soft NTND +BS  Groins: C/D/I bilaterally; no bleeding, hematoma, edema  Extremities: no pedal edema, +distal pulses b/l  Neuro: non focal, CARTY x4    LABS:                        12.0   19.7  )-----------( 171      ( 27 Oct 2017 07:18 )             35.1     10-27    137  |  99  |  15.0  ----------------------------<  152<H>  3.8   |  24.0  |  0.76    Ca    8.5<L>      27 Oct 2017 07:18  Mg     1.7     10-27      Assessment:   62 year old male patient with a history of hypertension and symptomatic paroxysmal atrial fibrillation with episodes recently increasing in frequency and duration (CHADSVASC = 1). Now POD #1 s/p uncomplicated radiofrequency ablation ([facility]/PVI, CTI line).     Plan:   Replete Mg & K now.   Elevated WBC post ablation (w/ mild elevation noted at PST)- pt remains afebrile & w/o complaint. Pt advised to monitor for fevers/chills or other constitutional symptoms & contact Dr. Dudley or PMD immediately w/ concerns.   Access site care and activity limitations reviewed w/ pt.   Eliquis started last PM - importance of strict compliance reinforced w/ pt, who expressed understanding.   Protonix & colchicine x 90 days - rx sent electronically.   Outpt f/up in 1-2 weeks - pt will call to schedule an appointment.   Ok to d/c pt home following electrolyte repletion.

## 2018-04-29 ENCOUNTER — INPATIENT (INPATIENT)
Facility: HOSPITAL | Age: 63
LOS: 4 days | Discharge: HOME | End: 2018-05-04
Attending: INTERNAL MEDICINE | Admitting: INTERNAL MEDICINE
Payer: COMMERCIAL

## 2018-04-29 VITALS
RESPIRATION RATE: 18 BRPM | DIASTOLIC BLOOD PRESSURE: 95 MMHG | WEIGHT: 184.97 LBS | HEIGHT: 68 IN | SYSTOLIC BLOOD PRESSURE: 156 MMHG | HEART RATE: 75 BPM | OXYGEN SATURATION: 97 % | TEMPERATURE: 98 F

## 2018-04-29 DIAGNOSIS — H26.9 UNSPECIFIED CATARACT: Chronic | ICD-10-CM

## 2018-04-29 DIAGNOSIS — Z98.890 OTHER SPECIFIED POSTPROCEDURAL STATES: Chronic | ICD-10-CM

## 2018-04-30 LAB
ALBUMIN SERPL ELPH-MCNC: 3.9 G/DL — SIGNIFICANT CHANGE UP (ref 3.3–5)
ALP SERPL-CCNC: 117 U/L — SIGNIFICANT CHANGE UP (ref 40–120)
ALT FLD-CCNC: 52 U/L — SIGNIFICANT CHANGE UP (ref 12–78)
ANION GAP SERPL CALC-SCNC: 10 MMOL/L — SIGNIFICANT CHANGE UP (ref 5–17)
ANISOCYTOSIS BLD QL: SIGNIFICANT CHANGE UP
APPEARANCE UR: CLEAR — SIGNIFICANT CHANGE UP
APTT BLD: 25.8 SEC — LOW (ref 27.5–37.4)
AST SERPL-CCNC: 79 U/L — HIGH (ref 15–37)
BACTERIA # UR AUTO: (no result)
BASOPHILS # BLD AUTO: 0.08 K/UL — SIGNIFICANT CHANGE UP (ref 0–0.2)
BASOPHILS NFR BLD AUTO: 0.5 % — SIGNIFICANT CHANGE UP (ref 0–2)
BILIRUB SERPL-MCNC: 2 MG/DL — HIGH (ref 0.2–1.2)
BILIRUB UR-MCNC: (no result)
BLD GP AB SCN SERPL QL: SIGNIFICANT CHANGE UP
BUN SERPL-MCNC: 20 MG/DL — SIGNIFICANT CHANGE UP (ref 7–23)
CALCIUM SERPL-MCNC: 9 MG/DL — SIGNIFICANT CHANGE UP (ref 8.5–10.1)
CHLORIDE SERPL-SCNC: 106 MMOL/L — SIGNIFICANT CHANGE UP (ref 96–108)
CK SERPL-CCNC: 60 U/L — SIGNIFICANT CHANGE UP (ref 26–308)
CO2 SERPL-SCNC: 25 MMOL/L — SIGNIFICANT CHANGE UP (ref 22–31)
COD CRY URNS QL: SIGNIFICANT CHANGE UP
COLOR SPEC: YELLOW — SIGNIFICANT CHANGE UP
COMMENT - URINE: SIGNIFICANT CHANGE UP
CREAT SERPL-MCNC: 1.01 MG/DL — SIGNIFICANT CHANGE UP (ref 0.5–1.3)
DACRYOCYTES BLD QL SMEAR: SIGNIFICANT CHANGE UP
DIFF PNL FLD: (no result)
ELLIPTOCYTES BLD QL SMEAR: SIGNIFICANT CHANGE UP
EOSINOPHIL # BLD AUTO: 0.47 K/UL — SIGNIFICANT CHANGE UP (ref 0–0.5)
EOSINOPHIL NFR BLD AUTO: 3 % — SIGNIFICANT CHANGE UP (ref 0–6)
EPI CELLS # UR: SIGNIFICANT CHANGE UP
GIANT PLATELETS BLD QL SMEAR: PRESENT — SIGNIFICANT CHANGE UP
GLUCOSE SERPL-MCNC: 121 MG/DL — HIGH (ref 70–99)
GLUCOSE UR QL: NEGATIVE MG/DL — SIGNIFICANT CHANGE UP
HCT VFR BLD CALC: 39.8 % — SIGNIFICANT CHANGE UP (ref 39–50)
HGB BLD-MCNC: 12.6 G/DL — LOW (ref 13–17)
HYPOCHROMIA BLD QL: SIGNIFICANT CHANGE UP
IMM GRANULOCYTES NFR BLD AUTO: 2.2 % — HIGH (ref 0–1.5)
INR BLD: 1.05 RATIO — SIGNIFICANT CHANGE UP (ref 0.88–1.16)
KETONES UR-MCNC: (no result)
LEUKOCYTE ESTERASE UR-ACNC: (no result)
LIDOCAIN IGE QN: >1500 U/L — HIGH (ref 73–393)
LYMPHOCYTES # BLD AUTO: 15.4 % — SIGNIFICANT CHANGE UP (ref 13–44)
LYMPHOCYTES # BLD AUTO: 2.38 K/UL — SIGNIFICANT CHANGE UP (ref 1–3.3)
MANUAL SMEAR VERIFICATION: SIGNIFICANT CHANGE UP
MCHC RBC-ENTMCNC: 20.4 PG — LOW (ref 27–34)
MCHC RBC-ENTMCNC: 31.7 GM/DL — LOW (ref 32–36)
MCV RBC AUTO: 64.5 FL — LOW (ref 80–100)
MICROCYTES BLD QL: SIGNIFICANT CHANGE UP
MONOCYTES # BLD AUTO: 1.28 K/UL — HIGH (ref 0–0.9)
MONOCYTES NFR BLD AUTO: 8.3 % — SIGNIFICANT CHANGE UP (ref 2–14)
NEUTROPHILS # BLD AUTO: 10.93 K/UL — HIGH (ref 1.8–7.4)
NEUTROPHILS NFR BLD AUTO: 70.6 % — SIGNIFICANT CHANGE UP (ref 43–77)
NITRITE UR-MCNC: NEGATIVE — SIGNIFICANT CHANGE UP
NRBC # BLD: 0 /100 WBCS — SIGNIFICANT CHANGE UP (ref 0–0)
OVALOCYTES BLD QL SMEAR: SIGNIFICANT CHANGE UP
PH UR: 5 — SIGNIFICANT CHANGE UP (ref 5–8)
PLAT MORPH BLD: NORMAL — SIGNIFICANT CHANGE UP
PLATELET # BLD AUTO: 162 K/UL — SIGNIFICANT CHANGE UP (ref 150–400)
POIKILOCYTOSIS BLD QL AUTO: SIGNIFICANT CHANGE UP
POLYCHROMASIA BLD QL SMEAR: SLIGHT — SIGNIFICANT CHANGE UP
POTASSIUM SERPL-MCNC: 4.2 MMOL/L — SIGNIFICANT CHANGE UP (ref 3.5–5.3)
POTASSIUM SERPL-SCNC: 4.2 MMOL/L — SIGNIFICANT CHANGE UP (ref 3.5–5.3)
PROT SERPL-MCNC: 7.3 GM/DL — SIGNIFICANT CHANGE UP (ref 6–8.3)
PROT UR-MCNC: 15 MG/DL
PROTHROM AB SERPL-ACNC: 11.4 SEC — SIGNIFICANT CHANGE UP (ref 9.8–12.7)
RBC # BLD: 6.17 M/UL — HIGH (ref 4.2–5.8)
RBC # FLD: 18.6 % — HIGH (ref 10.3–14.5)
RBC BLD AUTO: (no result)
RBC CASTS # UR COMP ASSIST: NEGATIVE /HPF — SIGNIFICANT CHANGE UP (ref 0–4)
SCHISTOCYTES BLD QL AUTO: SLIGHT — SIGNIFICANT CHANGE UP
SODIUM SERPL-SCNC: 141 MMOL/L — SIGNIFICANT CHANGE UP (ref 135–145)
SP GR SPEC: 1.03 — HIGH (ref 1.01–1.02)
TROPONIN I SERPL-MCNC: <0.015 NG/ML — SIGNIFICANT CHANGE UP (ref 0.01–0.04)
TYPE + AB SCN PNL BLD: SIGNIFICANT CHANGE UP
UROBILINOGEN FLD QL: 1 MG/DL
WBC # BLD: 15.48 K/UL — HIGH (ref 3.8–10.5)
WBC # FLD AUTO: 15.48 K/UL — HIGH (ref 3.8–10.5)
WBC UR QL: SIGNIFICANT CHANGE UP

## 2018-04-30 PROCEDURE — 93010 ELECTROCARDIOGRAM REPORT: CPT

## 2018-04-30 PROCEDURE — 74177 CT ABD & PELVIS W/CONTRAST: CPT | Mod: 26

## 2018-04-30 PROCEDURE — 99285 EMERGENCY DEPT VISIT HI MDM: CPT

## 2018-04-30 RX ORDER — ASPIRIN/CALCIUM CARB/MAGNESIUM 324 MG
81 TABLET ORAL DAILY
Qty: 0 | Refills: 0 | Status: DISCONTINUED | OUTPATIENT
Start: 2018-04-30 | End: 2018-05-03

## 2018-04-30 RX ORDER — METOPROLOL TARTRATE 50 MG
100 TABLET ORAL DAILY
Qty: 0 | Refills: 0 | Status: DISCONTINUED | OUTPATIENT
Start: 2018-04-30 | End: 2018-05-03

## 2018-04-30 RX ORDER — HYDROMORPHONE HYDROCHLORIDE 2 MG/ML
1 INJECTION INTRAMUSCULAR; INTRAVENOUS; SUBCUTANEOUS ONCE
Qty: 0 | Refills: 0 | Status: DISCONTINUED | OUTPATIENT
Start: 2018-04-30 | End: 2018-04-30

## 2018-04-30 RX ORDER — SODIUM CHLORIDE 9 MG/ML
1000 INJECTION INTRAMUSCULAR; INTRAVENOUS; SUBCUTANEOUS ONCE
Qty: 0 | Refills: 0 | Status: COMPLETED | OUTPATIENT
Start: 2018-04-30 | End: 2018-04-30

## 2018-04-30 RX ORDER — SODIUM CHLORIDE 9 MG/ML
1000 INJECTION INTRAMUSCULAR; INTRAVENOUS; SUBCUTANEOUS
Qty: 0 | Refills: 0 | Status: DISCONTINUED | OUTPATIENT
Start: 2018-04-30 | End: 2018-05-03

## 2018-04-30 RX ORDER — MORPHINE SULFATE 50 MG/1
6 CAPSULE, EXTENDED RELEASE ORAL EVERY 4 HOURS
Qty: 0 | Refills: 0 | Status: DISCONTINUED | OUTPATIENT
Start: 2018-04-30 | End: 2018-05-03

## 2018-04-30 RX ORDER — ENOXAPARIN SODIUM 100 MG/ML
40 INJECTION SUBCUTANEOUS EVERY 24 HOURS
Qty: 0 | Refills: 0 | Status: DISCONTINUED | OUTPATIENT
Start: 2018-04-30 | End: 2018-05-03

## 2018-04-30 RX ORDER — PANTOPRAZOLE SODIUM 20 MG/1
40 TABLET, DELAYED RELEASE ORAL
Qty: 0 | Refills: 0 | Status: DISCONTINUED | OUTPATIENT
Start: 2018-04-30 | End: 2018-05-03

## 2018-04-30 RX ORDER — MORPHINE SULFATE 50 MG/1
2 CAPSULE, EXTENDED RELEASE ORAL EVERY 4 HOURS
Qty: 0 | Refills: 0 | Status: DISCONTINUED | OUTPATIENT
Start: 2018-04-30 | End: 2018-05-03

## 2018-04-30 RX ORDER — SODIUM CHLORIDE 9 MG/ML
3 INJECTION INTRAMUSCULAR; INTRAVENOUS; SUBCUTANEOUS ONCE
Qty: 0 | Refills: 0 | Status: COMPLETED | OUTPATIENT
Start: 2018-04-30 | End: 2018-04-30

## 2018-04-30 RX ORDER — ONDANSETRON 8 MG/1
4 TABLET, FILM COATED ORAL ONCE
Qty: 0 | Refills: 0 | Status: COMPLETED | OUTPATIENT
Start: 2018-04-30 | End: 2018-04-30

## 2018-04-30 RX ORDER — ONDANSETRON 8 MG/1
4 TABLET, FILM COATED ORAL EVERY 6 HOURS
Qty: 0 | Refills: 0 | Status: DISCONTINUED | OUTPATIENT
Start: 2018-04-30 | End: 2018-05-03

## 2018-04-30 RX ADMIN — MORPHINE SULFATE 6 MILLIGRAM(S): 50 CAPSULE, EXTENDED RELEASE ORAL at 23:03

## 2018-04-30 RX ADMIN — ONDANSETRON 4 MILLIGRAM(S): 8 TABLET, FILM COATED ORAL at 00:30

## 2018-04-30 RX ADMIN — Medication 81 MILLIGRAM(S): at 10:34

## 2018-04-30 RX ADMIN — HYDROMORPHONE HYDROCHLORIDE 1 MILLIGRAM(S): 2 INJECTION INTRAMUSCULAR; INTRAVENOUS; SUBCUTANEOUS at 00:34

## 2018-04-30 RX ADMIN — MORPHINE SULFATE 6 MILLIGRAM(S): 50 CAPSULE, EXTENDED RELEASE ORAL at 10:34

## 2018-04-30 RX ADMIN — ENOXAPARIN SODIUM 40 MILLIGRAM(S): 100 INJECTION SUBCUTANEOUS at 10:34

## 2018-04-30 RX ADMIN — MORPHINE SULFATE 6 MILLIGRAM(S): 50 CAPSULE, EXTENDED RELEASE ORAL at 17:32

## 2018-04-30 RX ADMIN — SODIUM CHLORIDE 150 MILLILITER(S): 9 INJECTION INTRAMUSCULAR; INTRAVENOUS; SUBCUTANEOUS at 18:39

## 2018-04-30 RX ADMIN — PANTOPRAZOLE SODIUM 40 MILLIGRAM(S): 20 TABLET, DELAYED RELEASE ORAL at 10:32

## 2018-04-30 RX ADMIN — SODIUM CHLORIDE 150 MILLILITER(S): 9 INJECTION INTRAMUSCULAR; INTRAVENOUS; SUBCUTANEOUS at 11:55

## 2018-04-30 RX ADMIN — SODIUM CHLORIDE 3 MILLILITER(S): 9 INJECTION INTRAMUSCULAR; INTRAVENOUS; SUBCUTANEOUS at 00:25

## 2018-04-30 RX ADMIN — SODIUM CHLORIDE 1000 MILLILITER(S): 9 INJECTION INTRAMUSCULAR; INTRAVENOUS; SUBCUTANEOUS at 00:26

## 2018-04-30 RX ADMIN — ONDANSETRON 4 MILLIGRAM(S): 8 TABLET, FILM COATED ORAL at 10:34

## 2018-04-30 RX ADMIN — HYDROMORPHONE HYDROCHLORIDE 1 MILLIGRAM(S): 2 INJECTION INTRAMUSCULAR; INTRAVENOUS; SUBCUTANEOUS at 05:15

## 2018-04-30 RX ADMIN — Medication 100 MILLIGRAM(S): at 10:32

## 2018-04-30 RX ADMIN — ONDANSETRON 4 MILLIGRAM(S): 8 TABLET, FILM COATED ORAL at 01:48

## 2018-04-30 NOTE — ED ADULT NURSE NOTE - OBJECTIVE STATEMENT
63 year old male with a past medical history of hypertension presenting to the ED complaining of a three day history of generalized aching abdominal pain. Patient reports that the pain became [id number] worse tonight, going from 1-2/10 to 7-9/10 and is located across his entire abdomen. Patient states pain is worse after eating, and is associated with urinary retention and a sensation of bladder fullness.  Negative: Fevers, chills, headache, syncope, chest pain, shortness of breath, nausea, vomiting, constipation, diarrhea, or edema.   Patient has pan-abdominal tenderness upon palpation.

## 2018-04-30 NOTE — ED ADULT NURSE REASSESSMENT NOTE - NS ED NURSE REASSESS COMMENT FT1
0400: Nurse Rounding: Patient updated on his results and plan of care by RACHID Rivera and MD Waldron. Patient to be admitted, patient education on the admission process preformed. Patient states his pain is well controlled, has no complaints at this time. Patient is awaiting hospitalist MD admission assessment and bed placement. Will continue to monitor/reassess.

## 2018-04-30 NOTE — ED ADULT NURSE NOTE - CHPI ED SYMPTOMS NEG
no fever/no diarrhea/no blood in stool/no burning urination/no chills/no abdominal distension/no hematuria/no vomiting

## 2018-04-30 NOTE — ED PROVIDER NOTE - OBJECTIVE STATEMENT
Pt. is a 62 yo M with a hx of HTN, GERD, BPH, hx of hernia repair as child, cardiac ablation for afib not currently on anticoagulation BIB wife for diffuse abdominal pain X 1 day.  Pt. states any eating today caused intense pain.  Pain is mostly in mid and lower abdomen and worse on left side of abdomen.  Pain is nonradiating.  Pt. denies fever, vomiting, constipation or diarrhea.  +urinary frequency.  Pt. had outpatient labs done recently which showed elevated WBC, but urine was checked twice without any evidence of infection.   Pt. denies cough, sore throat, congestion. PMD Ced

## 2018-04-30 NOTE — H&P ADULT - HISTORY OF PRESENT ILLNESS
64 yo WM h/o Htn, PAfib s/p cardiac ablation, Prediabetes, presented with abdominal pain for the past 3 days; pain was severe 8/10 located in the epigastric and LUQ/LLQ areas. Pain was aggravated by food intake.  -found to have acute pancreatitis    PMHX: as above  PSHx: cardiac ablation  FamHx: father had DM, mother CABG  SocHx: no Etoh, no drugs, no smoking        REVIEW OF SYSTEMS:    CONSTITUTIONAL: No weakness, No fevers or chills  ENT: No ear ache, No sorethroat  NECK: No pain, No stiffness  RESPIRATORY: No cough, No wheezing, No hemoptysis; No dyspnea  CARDIOVASCULAR: No chest pain, No palpitations  GASTROINTESTINAL: ++ abd pain, No nausea, No vomiting, No hematemesis, No diarrhea or constipation. No melena, No hematochezia.  GENITOURINARY: No dysuria, No  hematuria  NEUROLOGICAL: No diplopia, No paresthesia, No motor dysfunction  MUSCULOSKELETAL: No arthralgia, No myalgia  SKIN: No rashes, or lesions   PSYCH: no anxiety, no suicidal ideation    All other review of systems is negative unless indicated above    Vital Signs Last 24 Hrs  T(C): 37.9 (30 Apr 2018 16:39), Max: 37.9 (30 Apr 2018 16:39)  T(F): 100.3 (30 Apr 2018 16:39), Max: 100.3 (30 Apr 2018 16:39)  HR: 99 (30 Apr 2018 11:53) (72 - 100)  BP: 102/60 (30 Apr 2018 11:53) (102/60 - 156/95)  RR: 18 (30 Apr 2018 11:53) (18 - 19)  SpO2: 94% (30 Apr 2018 11:53) (94% - 100%)    PHYSICAL EXAM:    GENERAL: NAD, Well nourished  HEENT:  NC/AT, EOMI, PERRLA, No scleral icterus, Moist mucous membranes  NECK: Supple, No JVD  CNS:  Alert & Oriented X3, Motor Strength 5/5 B/L upper and lower extremities; DTRs 2+ intact   LUNG: Normal Breath sounds, Clear to auscultation bilaterally, No rales, No rhonchi, No wheezing  HEART: RRR; No murmurs, No rubs  ABDOMEN: +BS, ST/ND/NT  GENITOURINARY: Voiding, Bladder not distended  EXTREMITIES:  2+ Peripheral Pulses, No clubbing, No cyanosis, No tibial edema  MUSCULOSKELTAL: Joints normal ROM, No TTP, No effusion  VAGINAL: deferred  SKIN: no rashes  RECTAL: deferred, not indicated  BREAST: deferred                          12.6   15.48 )-----------( 162      ( 30 Apr 2018 00:01 )             39.8     04-30    141  |  106  |  20  ----------------------------<  121<H>  4.2   |  25  |  1.01    Ca    9.0      30 Apr 2018 00:01    TPro  7.3  /  Alb  3.9  /  TBili  2.0<H>  /  DBili  x   /  AST  79<H>  /  ALT  52  /  AlkPhos  117  04-30    Vancomycin levels:   Cultures:     MEDICATIONS  (STANDING):  aspirin enteric coated 81 milliGRAM(s) Oral daily  enoxaparin Injectable 40 milliGRAM(s) SubCutaneous every 24 hours  metoprolol succinate  milliGRAM(s) Oral daily  pantoprazole    Tablet 40 milliGRAM(s) Oral before breakfast  sodium chloride 0.9%. 1000 milliLiter(s) (150 mL/Hr) IV Continuous <Continuous>    MEDICATIONS  (PRN):  aluminum hydroxide/magnesium hydroxide/simethicone Suspension 30 milliLiter(s) Oral every 4 hours PRN Dyspepsia  morphine  - Injectable 2 milliGRAM(s) IV Push every 4 hours PRN Moderate Pain (4 - 6)  morphine  - Injectable 6 milliGRAM(s) IV Push every 4 hours PRN Severe Pain (7 - 10)  ondansetron Injectable 4 milliGRAM(s) IV Push every 6 hours PRN Nausea      all labs reviewed  all imaging reviewed    Assessment and Plan:    1. Acute Pancreatitis:  IV fluids  NPO except meds  Check RUQ Sonogram to r/o CBD stone  GI Evaluation    2. PAfib:  s/p ablation  cw ASA/Lopressor

## 2018-05-01 LAB
ALBUMIN SERPL ELPH-MCNC: 3.1 G/DL — LOW (ref 3.3–5)
ALP SERPL-CCNC: 163 U/L — HIGH (ref 40–120)
ALT FLD-CCNC: 324 U/L — HIGH (ref 12–78)
ANION GAP SERPL CALC-SCNC: 8 MMOL/L — SIGNIFICANT CHANGE UP (ref 5–17)
AST SERPL-CCNC: 206 U/L — HIGH (ref 15–37)
BILIRUB SERPL-MCNC: 5.8 MG/DL — HIGH (ref 0.2–1.2)
BUN SERPL-MCNC: 11 MG/DL — SIGNIFICANT CHANGE UP (ref 7–23)
CALCIUM SERPL-MCNC: 8.5 MG/DL — SIGNIFICANT CHANGE UP (ref 8.5–10.1)
CHLORIDE SERPL-SCNC: 109 MMOL/L — HIGH (ref 96–108)
CHOLEST SERPL-MCNC: 117 MG/DL — SIGNIFICANT CHANGE UP (ref 10–199)
CO2 SERPL-SCNC: 26 MMOL/L — SIGNIFICANT CHANGE UP (ref 22–31)
CREAT SERPL-MCNC: 0.97 MG/DL — SIGNIFICANT CHANGE UP (ref 0.5–1.3)
GLUCOSE SERPL-MCNC: 111 MG/DL — HIGH (ref 70–99)
HBA1C BLD-MCNC: 5.7 % — HIGH (ref 4–5.6)
HCT VFR BLD CALC: 33.9 % — LOW (ref 39–50)
HDLC SERPL-MCNC: 24 MG/DL — LOW (ref 40–125)
HGB BLD-MCNC: 10.8 G/DL — LOW (ref 13–17)
LIDOCAIN IGE QN: 1486 U/L — HIGH (ref 73–393)
LIPID PNL WITH DIRECT LDL SERPL: 67 MG/DL — SIGNIFICANT CHANGE UP
MCHC RBC-ENTMCNC: 20.7 PG — LOW (ref 27–34)
MCHC RBC-ENTMCNC: 31.9 GM/DL — LOW (ref 32–36)
MCV RBC AUTO: 65.1 FL — LOW (ref 80–100)
NRBC # BLD: 0 /100 WBCS — SIGNIFICANT CHANGE UP (ref 0–0)
PLATELET # BLD AUTO: 128 K/UL — LOW (ref 150–400)
POTASSIUM SERPL-MCNC: 4.7 MMOL/L — SIGNIFICANT CHANGE UP (ref 3.5–5.3)
POTASSIUM SERPL-SCNC: 4.7 MMOL/L — SIGNIFICANT CHANGE UP (ref 3.5–5.3)
PROT SERPL-MCNC: 6.3 GM/DL — SIGNIFICANT CHANGE UP (ref 6–8.3)
RBC # BLD: 5.21 M/UL — SIGNIFICANT CHANGE UP (ref 4.2–5.8)
RBC # FLD: 17.3 % — HIGH (ref 10.3–14.5)
SODIUM SERPL-SCNC: 143 MMOL/L — SIGNIFICANT CHANGE UP (ref 135–145)
TOTAL CHOLESTEROL/HDL RATIO MEASUREMENT: 4.9 RATIO — SIGNIFICANT CHANGE UP (ref 3.4–9.6)
TRIGL SERPL-MCNC: 128 MG/DL — SIGNIFICANT CHANGE UP (ref 10–149)
WBC # BLD: 11.62 K/UL — HIGH (ref 3.8–10.5)
WBC # FLD AUTO: 11.62 K/UL — HIGH (ref 3.8–10.5)

## 2018-05-01 PROCEDURE — 74181 MRI ABDOMEN W/O CONTRAST: CPT | Mod: 26

## 2018-05-01 PROCEDURE — 76700 US EXAM ABDOM COMPLETE: CPT | Mod: 26

## 2018-05-01 RX ADMIN — MORPHINE SULFATE 6 MILLIGRAM(S): 50 CAPSULE, EXTENDED RELEASE ORAL at 10:00

## 2018-05-01 RX ADMIN — MORPHINE SULFATE 6 MILLIGRAM(S): 50 CAPSULE, EXTENDED RELEASE ORAL at 10:30

## 2018-05-01 RX ADMIN — MORPHINE SULFATE 6 MILLIGRAM(S): 50 CAPSULE, EXTENDED RELEASE ORAL at 18:49

## 2018-05-01 RX ADMIN — SODIUM CHLORIDE 150 MILLILITER(S): 9 INJECTION INTRAMUSCULAR; INTRAVENOUS; SUBCUTANEOUS at 01:26

## 2018-05-01 RX ADMIN — PANTOPRAZOLE SODIUM 40 MILLIGRAM(S): 20 TABLET, DELAYED RELEASE ORAL at 05:23

## 2018-05-01 RX ADMIN — ENOXAPARIN SODIUM 40 MILLIGRAM(S): 100 INJECTION SUBCUTANEOUS at 10:01

## 2018-05-01 RX ADMIN — Medication 81 MILLIGRAM(S): at 10:01

## 2018-05-01 RX ADMIN — Medication 100 MILLIGRAM(S): at 05:23

## 2018-05-02 LAB
ALBUMIN SERPL ELPH-MCNC: 3.2 G/DL — LOW (ref 3.3–5)
ALP SERPL-CCNC: 180 U/L — HIGH (ref 40–120)
ALT FLD-CCNC: 243 U/L — HIGH (ref 12–78)
ANION GAP SERPL CALC-SCNC: 11 MMOL/L — SIGNIFICANT CHANGE UP (ref 5–17)
AST SERPL-CCNC: 109 U/L — HIGH (ref 15–37)
BILIRUB SERPL-MCNC: 3.6 MG/DL — HIGH (ref 0.2–1.2)
BUN SERPL-MCNC: 10 MG/DL — SIGNIFICANT CHANGE UP (ref 7–23)
CALCIUM SERPL-MCNC: 8.8 MG/DL — SIGNIFICANT CHANGE UP (ref 8.5–10.1)
CHLORIDE SERPL-SCNC: 107 MMOL/L — SIGNIFICANT CHANGE UP (ref 96–108)
CO2 SERPL-SCNC: 21 MMOL/L — LOW (ref 22–31)
CREAT SERPL-MCNC: 0.81 MG/DL — SIGNIFICANT CHANGE UP (ref 0.5–1.3)
GLUCOSE SERPL-MCNC: 105 MG/DL — HIGH (ref 70–99)
HCT VFR BLD CALC: 36.5 % — LOW (ref 39–50)
HGB BLD-MCNC: 11.6 G/DL — LOW (ref 13–17)
MCHC RBC-ENTMCNC: 20.5 PG — LOW (ref 27–34)
MCHC RBC-ENTMCNC: 31.8 GM/DL — LOW (ref 32–36)
MCV RBC AUTO: 64.6 FL — LOW (ref 80–100)
NRBC # BLD: 0 /100 WBCS — SIGNIFICANT CHANGE UP (ref 0–0)
PLATELET # BLD AUTO: 157 K/UL — SIGNIFICANT CHANGE UP (ref 150–400)
POTASSIUM SERPL-MCNC: 4 MMOL/L — SIGNIFICANT CHANGE UP (ref 3.5–5.3)
POTASSIUM SERPL-SCNC: 4 MMOL/L — SIGNIFICANT CHANGE UP (ref 3.5–5.3)
PROT SERPL-MCNC: 6.9 GM/DL — SIGNIFICANT CHANGE UP (ref 6–8.3)
RBC # BLD: 5.65 M/UL — SIGNIFICANT CHANGE UP (ref 4.2–5.8)
RBC # FLD: 18 % — HIGH (ref 10.3–14.5)
SODIUM SERPL-SCNC: 139 MMOL/L — SIGNIFICANT CHANGE UP (ref 135–145)
WBC # BLD: 9.52 K/UL — SIGNIFICANT CHANGE UP (ref 3.8–10.5)
WBC # FLD AUTO: 9.52 K/UL — SIGNIFICANT CHANGE UP (ref 3.8–10.5)

## 2018-05-02 RX ADMIN — SODIUM CHLORIDE 150 MILLILITER(S): 9 INJECTION INTRAMUSCULAR; INTRAVENOUS; SUBCUTANEOUS at 00:54

## 2018-05-02 RX ADMIN — SODIUM CHLORIDE 150 MILLILITER(S): 9 INJECTION INTRAMUSCULAR; INTRAVENOUS; SUBCUTANEOUS at 22:20

## 2018-05-02 RX ADMIN — MORPHINE SULFATE 6 MILLIGRAM(S): 50 CAPSULE, EXTENDED RELEASE ORAL at 23:06

## 2018-05-02 RX ADMIN — MORPHINE SULFATE 6 MILLIGRAM(S): 50 CAPSULE, EXTENDED RELEASE ORAL at 00:53

## 2018-05-02 RX ADMIN — PANTOPRAZOLE SODIUM 40 MILLIGRAM(S): 20 TABLET, DELAYED RELEASE ORAL at 04:58

## 2018-05-02 RX ADMIN — ENOXAPARIN SODIUM 40 MILLIGRAM(S): 100 INJECTION SUBCUTANEOUS at 12:07

## 2018-05-02 RX ADMIN — MORPHINE SULFATE 6 MILLIGRAM(S): 50 CAPSULE, EXTENDED RELEASE ORAL at 18:45

## 2018-05-02 RX ADMIN — Medication 100 MILLIGRAM(S): at 04:57

## 2018-05-02 RX ADMIN — MORPHINE SULFATE 6 MILLIGRAM(S): 50 CAPSULE, EXTENDED RELEASE ORAL at 07:47

## 2018-05-02 RX ADMIN — MORPHINE SULFATE 6 MILLIGRAM(S): 50 CAPSULE, EXTENDED RELEASE ORAL at 08:15

## 2018-05-02 RX ADMIN — Medication 81 MILLIGRAM(S): at 12:07

## 2018-05-02 NOTE — CONSULT NOTE ADULT - SUBJECTIVE AND OBJECTIVE BOX
62 yo male presented to ER with acute onset severe post prandial abdominal pain, localized to mid epigastrum. Associated with nausea. Diagnosed with pancreatitis on admission. Sono shows gallstones. MRCP shows improvement in pancreatitis and no evidence of CBD stones despite elevated LFT's. Patient currently feels better.    PMH- HTN, paroxysmal a fib, s/p AV node ablation  PSH- inguinal hernia repairsFamHx: father had DM, mother CABG  SocHx: no Etoh, no drugs, no smoking  MEDICATIONS  (STANDING):  aspirin enteric coated 81 milliGRAM(s) Oral daily  enoxaparin Injectable 40 milliGRAM(s) SubCutaneous every 24 hours  metoprolol succinate  milliGRAM(s) Oral daily  pantoprazole    Tablet 40 milliGRAM(s) Oral before breakfast  sodium chloride 0.9%. 1000 milliLiter(s) (150 mL/Hr) IV Continuous <Continuous>    MEDICATIONS  (PRN):  aluminum hydroxide/magnesium hydroxide/simethicone Suspension 30 milliLiter(s) Oral every 4 hours PRN Dyspepsia  morphine  - Injectable 2 milliGRAM(s) IV Push every 4 hours PRN Moderate Pain (4 - 6)  morphine  - Injectable 6 milliGRAM(s) IV Push every 4 hours PRN Severe Pain (7 - 10)  ondansetron Injectable 4 milliGRAM(s) IV Push every 6 hours PRN Nausea      REVIEW OF SYSTEMS:    CONSTITUTIONAL: No weakness, No fevers or chills  ENT: No ear ache, No sorethroat  NECK: No pain, No stiffness  RESPIRATORY: No cough, No wheezing, No hemoptysis; No dyspnea  CARDIOVASCULAR: No chest pain, No palpitations  GASTROINTESTINAL: ++ abd pain, No nausea, No vomiting, No hematemesis, No diarrhea or constipation. No melena, No hematochezia.  GENITOURINARY: No dysuria, No  hematuria  NEUROLOGICAL: No diplopia, No paresthesia, No motor dysfunction  MUSCULOSKELETAL: No arthralgia, No myalgia  SKIN: No rashes, or lesions   PSYCH: no anxiety, no suicidal ideation      PE  VSS  Skin- warm,dry  HEENT- pupils equal, sclerae anicteric  Neck- no JVD  Lungs- clear  Cor- RRR  Abd- + BS, soft, non tender  Ext- no edema
Patient is a 63y old  Male who presents with a chief complaint of     HPI: 64yo male with 3 weeks of epigastric discomfort.  Initially on and off but progressed over last few days and came to ER.  Pt also with nausea.  He still has significant pain but feels a little better with pain meds.  Pt denies prior episode, any etoh use, or recent medication changes.    PAST MEDICAL & SURGICAL HISTORY:  Palpitations  Chest pain  HTN (hypertension)  PAC (premature atrial contraction)  Afib  H/O: knee surgery: bilateral  Cataract    MEDICATIONS  (STANDING):  aspirin enteric coated 81 milliGRAM(s) Oral daily  enoxaparin Injectable 40 milliGRAM(s) SubCutaneous every 24 hours  metoprolol succinate  milliGRAM(s) Oral daily  pantoprazole    Tablet 40 milliGRAM(s) Oral before breakfast  sodium chloride 0.9%. 1000 milliLiter(s) (150 mL/Hr) IV Continuous <Continuous>    MEDICATIONS  (PRN):  aluminum hydroxide/magnesium hydroxide/simethicone Suspension 30 milliLiter(s) Oral every 4 hours PRN Dyspepsia  morphine  - Injectable 2 milliGRAM(s) IV Push every 4 hours PRN Moderate Pain (4 - 6)  morphine  - Injectable 6 milliGRAM(s) IV Push every 4 hours PRN Severe Pain (7 - 10)  ondansetron Injectable 4 milliGRAM(s) IV Push every 6 hours PRN Nausea    Allergies  No Known Allergies    SOCIAL HISTORY: no etoh, lives with wife    FAMILY HISTORY: nc      REVIEW OF SYSTEMS:    CONSTITUTIONAL: No weakness, fevers or chills  EYES/ENT: No visual changes;  No vertigo or throat pain   NECK: No pain or stiffness  RESPIRATORY: No cough, wheezing, hemoptysis; No shortness of breath  CARDIOVASCULAR: No chest pain or palpitations  GASTROINTESTINAL: as above  GENITOURINARY: No dysuria, frequency or hematuria  NEUROLOGICAL: No numbness or weakness  SKIN: No itching, burning, rashes, or lesions   PSYCH: Normal mood and affect  All other review of systems is negative unless indicated above.    Vital Signs Last 24 Hrs  T(C): 37.2 (30 Apr 2018 11:53), Max: 37.2 (30 Apr 2018 11:53)  T(F): 98.9 (30 Apr 2018 11:53), Max: 98.9 (30 Apr 2018 11:53)  HR: 99 (30 Apr 2018 11:53) (72 - 100)  BP: 102/60 (30 Apr 2018 11:53) (102/60 - 156/95)  BP(mean): --  RR: 18 (30 Apr 2018 11:53) (18 - 19)  SpO2: 94% (30 Apr 2018 11:53) (94% - 100%)    PHYSICAL EXAM:    Constitutional: NAD, well-developed  HEENT: MMM  Neck: No LAD  Respiratory: CTAB  Cardiovascular: S1 and S2, RRR, no M/G/R  Gastrointestinal: BS+, soft, markedly tender epigastrium and LUQ  Extremities: No peripheral edema  Vascular: 2+ peripheral pulses  Neurological: A/O x 3, no focal deficits  Psychiatric: Normal mood, normal affect  Skin: No rashes    LABS:                        12.6   15.48 )-----------( 162      ( 30 Apr 2018 00:01 )             39.8     04-30    141  |  106  |  20  ----------------------------<  121<H>  4.2   |  25  |  1.01    Ca    9.0      30 Apr 2018 00:01    TPro  7.3  /  Alb  3.9  /  TBili  2.0<H>  /  DBili  x   /  AST  79<H>  /  ALT  52  /  AlkPhos  117  04-30    PT/INR - ( 30 Apr 2018 00:01 )   PT: 11.4 sec;   INR: 1.05 ratio         PTT - ( 30 Apr 2018 00:01 )  PTT:25.8 sec  LIVER FUNCTIONS - ( 30 Apr 2018 00:01 )  Alb: 3.9 g/dL / Pro: 7.3 gm/dL / ALK PHOS: 117 U/L / ALT: 52 U/L / AST: 79 U/L / GGT: x             RADIOLOGY & ADDITIONAL STUDIES:

## 2018-05-02 NOTE — CONSULT NOTE ADULT - ASSESSMENT
Gallstone pancreatitis. Clinically improved. Plan lap choley tomorrow.
62yo male with pancreatitis of unclear etiology  continue IV fluids, pain control, NPO  check sono r/o gallstones or sludge  check triglycerides  if above all negative would consider outpt EUS in several weeks to evaluate for anatomic causes

## 2018-05-03 ENCOUNTER — RESULT REVIEW (OUTPATIENT)
Age: 63
End: 2018-05-03

## 2018-05-03 PROCEDURE — 88304 TISSUE EXAM BY PATHOLOGIST: CPT | Mod: 26

## 2018-05-03 RX ORDER — HYDROMORPHONE HYDROCHLORIDE 2 MG/ML
1 INJECTION INTRAMUSCULAR; INTRAVENOUS; SUBCUTANEOUS EVERY 4 HOURS
Qty: 0 | Refills: 0 | Status: DISCONTINUED | OUTPATIENT
Start: 2018-05-03 | End: 2018-05-04

## 2018-05-03 RX ORDER — ASPIRIN/CALCIUM CARB/MAGNESIUM 324 MG
81 TABLET ORAL DAILY
Qty: 0 | Refills: 0 | Status: DISCONTINUED | OUTPATIENT
Start: 2018-05-03 | End: 2018-05-04

## 2018-05-03 RX ORDER — SODIUM CHLORIDE 9 MG/ML
1000 INJECTION INTRAMUSCULAR; INTRAVENOUS; SUBCUTANEOUS
Qty: 0 | Refills: 0 | Status: DISCONTINUED | OUTPATIENT
Start: 2018-05-03 | End: 2018-05-03

## 2018-05-03 RX ORDER — ONDANSETRON 8 MG/1
4 TABLET, FILM COATED ORAL ONCE
Qty: 0 | Refills: 0 | Status: DISCONTINUED | OUTPATIENT
Start: 2018-05-03 | End: 2018-05-03

## 2018-05-03 RX ORDER — HYDROMORPHONE HYDROCHLORIDE 2 MG/ML
0.5 INJECTION INTRAMUSCULAR; INTRAVENOUS; SUBCUTANEOUS
Qty: 0 | Refills: 0 | Status: DISCONTINUED | OUTPATIENT
Start: 2018-05-03 | End: 2018-05-03

## 2018-05-03 RX ORDER — OXYCODONE AND ACETAMINOPHEN 5; 325 MG/1; MG/1
1 TABLET ORAL EVERY 4 HOURS
Qty: 0 | Refills: 0 | Status: DISCONTINUED | OUTPATIENT
Start: 2018-05-03 | End: 2018-05-04

## 2018-05-03 RX ORDER — ACETAMINOPHEN 500 MG
1000 TABLET ORAL ONCE
Qty: 0 | Refills: 0 | Status: COMPLETED | OUTPATIENT
Start: 2018-05-03 | End: 2018-05-03

## 2018-05-03 RX ORDER — SODIUM CHLORIDE 9 MG/ML
1000 INJECTION, SOLUTION INTRAVENOUS
Qty: 0 | Refills: 0 | Status: DISCONTINUED | OUTPATIENT
Start: 2018-05-03 | End: 2018-05-04

## 2018-05-03 RX ORDER — ENOXAPARIN SODIUM 100 MG/ML
40 INJECTION SUBCUTANEOUS DAILY
Qty: 0 | Refills: 0 | Status: DISCONTINUED | OUTPATIENT
Start: 2018-05-03 | End: 2018-05-04

## 2018-05-03 RX ORDER — ONDANSETRON 8 MG/1
4 TABLET, FILM COATED ORAL EVERY 6 HOURS
Qty: 0 | Refills: 0 | Status: DISCONTINUED | OUTPATIENT
Start: 2018-05-03 | End: 2018-05-04

## 2018-05-03 RX ORDER — OXYCODONE HYDROCHLORIDE 5 MG/1
5 TABLET ORAL EVERY 4 HOURS
Qty: 0 | Refills: 0 | Status: DISCONTINUED | OUTPATIENT
Start: 2018-05-03 | End: 2018-05-03

## 2018-05-03 RX ADMIN — HYDROMORPHONE HYDROCHLORIDE 0.5 MILLIGRAM(S): 2 INJECTION INTRAMUSCULAR; INTRAVENOUS; SUBCUTANEOUS at 15:03

## 2018-05-03 RX ADMIN — PANTOPRAZOLE SODIUM 40 MILLIGRAM(S): 20 TABLET, DELAYED RELEASE ORAL at 06:12

## 2018-05-03 RX ADMIN — HYDROMORPHONE HYDROCHLORIDE 0.5 MILLIGRAM(S): 2 INJECTION INTRAMUSCULAR; INTRAVENOUS; SUBCUTANEOUS at 14:52

## 2018-05-03 RX ADMIN — HYDROMORPHONE HYDROCHLORIDE 1 MILLIGRAM(S): 2 INJECTION INTRAMUSCULAR; INTRAVENOUS; SUBCUTANEOUS at 21:25

## 2018-05-03 RX ADMIN — SODIUM CHLORIDE 100 MILLILITER(S): 9 INJECTION, SOLUTION INTRAVENOUS at 16:18

## 2018-05-03 RX ADMIN — SODIUM CHLORIDE 150 MILLILITER(S): 9 INJECTION INTRAMUSCULAR; INTRAVENOUS; SUBCUTANEOUS at 12:04

## 2018-05-03 RX ADMIN — Medication 400 MILLIGRAM(S): at 15:12

## 2018-05-03 RX ADMIN — OXYCODONE AND ACETAMINOPHEN 1 TABLET(S): 5; 325 TABLET ORAL at 17:59

## 2018-05-03 RX ADMIN — Medication 1000 MILLIGRAM(S): at 16:22

## 2018-05-03 RX ADMIN — SODIUM CHLORIDE 150 MILLILITER(S): 9 INJECTION INTRAMUSCULAR; INTRAVENOUS; SUBCUTANEOUS at 06:20

## 2018-05-03 RX ADMIN — HYDROMORPHONE HYDROCHLORIDE 0.5 MILLIGRAM(S): 2 INJECTION INTRAMUSCULAR; INTRAVENOUS; SUBCUTANEOUS at 16:21

## 2018-05-03 RX ADMIN — HYDROMORPHONE HYDROCHLORIDE 0.5 MILLIGRAM(S): 2 INJECTION INTRAMUSCULAR; INTRAVENOUS; SUBCUTANEOUS at 16:22

## 2018-05-03 RX ADMIN — OXYCODONE AND ACETAMINOPHEN 1 TABLET(S): 5; 325 TABLET ORAL at 23:40

## 2018-05-03 RX ADMIN — HYDROMORPHONE HYDROCHLORIDE 0.5 MILLIGRAM(S): 2 INJECTION INTRAMUSCULAR; INTRAVENOUS; SUBCUTANEOUS at 15:13

## 2018-05-03 RX ADMIN — HYDROMORPHONE HYDROCHLORIDE 0.5 MILLIGRAM(S): 2 INJECTION INTRAMUSCULAR; INTRAVENOUS; SUBCUTANEOUS at 15:23

## 2018-05-03 RX ADMIN — MORPHINE SULFATE 6 MILLIGRAM(S): 50 CAPSULE, EXTENDED RELEASE ORAL at 06:15

## 2018-05-03 RX ADMIN — Medication 100 MILLIGRAM(S): at 06:12

## 2018-05-03 RX ADMIN — OXYCODONE AND ACETAMINOPHEN 1 TABLET(S): 5; 325 TABLET ORAL at 17:17

## 2018-05-03 NOTE — BRIEF OPERATIVE NOTE - PROCEDURE
<<-----Click on this checkbox to enter Procedure Cholecystectomy, laparoscopic  05/03/2018    Active  PAULO

## 2018-05-04 ENCOUNTER — TRANSCRIPTION ENCOUNTER (OUTPATIENT)
Age: 63
End: 2018-05-04

## 2018-05-04 VITALS
SYSTOLIC BLOOD PRESSURE: 119 MMHG | TEMPERATURE: 99 F | RESPIRATION RATE: 17 BRPM | HEART RATE: 96 BPM | OXYGEN SATURATION: 94 % | DIASTOLIC BLOOD PRESSURE: 68 MMHG

## 2018-05-04 RX ORDER — ASPIRIN/CALCIUM CARB/MAGNESIUM 324 MG
1 TABLET ORAL
Qty: 0 | Refills: 0 | COMMUNITY

## 2018-05-04 RX ORDER — OXYCODONE HYDROCHLORIDE 5 MG/1
1 TABLET ORAL
Qty: 30 | Refills: 0
Start: 2018-05-04

## 2018-05-04 RX ADMIN — OXYCODONE AND ACETAMINOPHEN 1 TABLET(S): 5; 325 TABLET ORAL at 13:38

## 2018-05-04 RX ADMIN — OXYCODONE AND ACETAMINOPHEN 1 TABLET(S): 5; 325 TABLET ORAL at 08:24

## 2018-05-04 RX ADMIN — OXYCODONE AND ACETAMINOPHEN 1 TABLET(S): 5; 325 TABLET ORAL at 09:31

## 2018-05-04 RX ADMIN — HYDROMORPHONE HYDROCHLORIDE 1 MILLIGRAM(S): 2 INJECTION INTRAMUSCULAR; INTRAVENOUS; SUBCUTANEOUS at 07:31

## 2018-05-04 RX ADMIN — OXYCODONE AND ACETAMINOPHEN 1 TABLET(S): 5; 325 TABLET ORAL at 12:39

## 2018-05-04 RX ADMIN — HYDROMORPHONE HYDROCHLORIDE 1 MILLIGRAM(S): 2 INJECTION INTRAMUSCULAR; INTRAVENOUS; SUBCUTANEOUS at 06:08

## 2018-05-04 RX ADMIN — Medication 81 MILLIGRAM(S): at 11:32

## 2018-05-04 RX ADMIN — SODIUM CHLORIDE 100 MILLILITER(S): 9 INJECTION, SOLUTION INTRAVENOUS at 01:22

## 2018-05-04 NOTE — PROGRESS NOTE ADULT - PROVIDER SPECIALTY LIST ADULT
Gastroenterology
Gastroenterology
Hospitalist
Gastroenterology
Gastroenterology

## 2018-05-04 NOTE — DISCHARGE NOTE ADULT - CARE PLAN
Principal Discharge DX:	Acute pancreatitis, unspecified complication status, unspecified pancreatitis type  Goal:	feel well  Assessment and plan of treatment:	follow up with dr Atwood  Secondary Diagnosis:	Afib

## 2018-05-04 NOTE — PROGRESS NOTE ADULT - ASSESSMENT
62 y/o male s/p lap julian for gallstone pancreatitis.    Plan:  Tolerating diet.  Pain control.   OOB ambulate.    Will no longer follow, pt to f/u as outpt.    Discussed with pt and Dr. Thakkar.
64yo male with resolved gallstone pancreatitis  for lap ccy today
62 y/o male with abdominal pain.    Impression:  Gallstone pancreatitis.    Plan:  Keep NPO  continue IV fluids   pain control  awaiting surgical consult for possible julian?    Discussed with pt and Dr. Thakkar.
64 y/o male with abdominal pain.    Impression:  Pancreatitis of unclear etiology    Plan:  NPO  continue IV fluids   pain control  awaiting abdominal sono r/o gallstones or sludge  check triglycerides    if above all negative would consider outpt EUS in several weeks to evaluate for anatomic causes.    Discussed with pt and Dr. Smith.

## 2018-05-04 NOTE — PROGRESS NOTE ADULT - SUBJECTIVE AND OBJECTIVE BOX
Patient is a 63y old  Male who presents with a chief complaint of     HPI:  pt feeling well   pain-free and awaiting surgery today    PAST MEDICAL & SURGICAL HISTORY:  Palpitations  Chest pain  HTN (hypertension)  PAC (premature atrial contraction)  Afib  H/O: knee surgery: bilateral  Cataract    MEDICATIONS  (STANDING):  aspirin enteric coated 81 milliGRAM(s) Oral daily  enoxaparin Injectable 40 milliGRAM(s) SubCutaneous every 24 hours  metoprolol succinate  milliGRAM(s) Oral daily  pantoprazole    Tablet 40 milliGRAM(s) Oral before breakfast  sodium chloride 0.9%. 1000 milliLiter(s) (150 mL/Hr) IV Continuous <Continuous>    MEDICATIONS  (PRN):  aluminum hydroxide/magnesium hydroxide/simethicone Suspension 30 milliLiter(s) Oral every 4 hours PRN Dyspepsia  morphine  - Injectable 2 milliGRAM(s) IV Push every 4 hours PRN Moderate Pain (4 - 6)  morphine  - Injectable 6 milliGRAM(s) IV Push every 4 hours PRN Severe Pain (7 - 10)  ondansetron Injectable 4 milliGRAM(s) IV Push every 6 hours PRN Nausea    Allergies  No Known Allergies    REVIEW OF SYSTEMS:    CONSTITUTIONAL: No weakness, fevers or chills  RESPIRATORY: No cough, wheezing, hemoptysis; No shortness of breath  CARDIOVASCULAR: No chest pain or palpitations  GASTROINTESTINAL: No abdominal or epigastric pain. No nausea, vomiting, or hematemesis; No diarrhea or constipation. No melena or hematochezia.  All other review of systems is negative unless indicated above.    Vital Signs Last 24 Hrs  T(C): 36.6 (03 May 2018 05:16), Max: 36.7 (02 May 2018 11:12)  T(F): 97.9 (03 May 2018 05:16), Max: 98 (02 May 2018 11:12)  HR: 81 (03 May 2018 05:16) (81 - 87)  BP: 128/80 (03 May 2018 05:16) (126/71 - 128/80)  BP(mean): --  RR: 18 (03 May 2018 05:16) (18 - 18)  SpO2: 96% (03 May 2018 05:16) (94% - 99%)    PHYSICAL EXAM:    Constitutional: NAD, well-developed  Respiratory: CTAB  Cardiovascular: S1 and S2, RRR, no M/G/R  Gastrointestinal: BS+, soft, NT/ND      LABS:                        11.6   9.52  )-----------( 157      ( 02 May 2018 07:44 )             36.5     05-02    139  |  107  |  10  ----------------------------<  105<H>  4.0   |  21<L>  |  0.81    Ca    8.8      02 May 2018 07:44    TPro  6.9  /  Alb  3.2<L>  /  TBili  3.6<H>  /  DBili  x   /  AST  109<H>  /  ALT  243<H>  /  AlkPhos  180<H>  05-02      LIVER FUNCTIONS - ( 02 May 2018 07:44 )  Alb: 3.2 g/dL / Pro: 6.9 gm/dL / ALK PHOS: 180 U/L / ALT: 243 U/L / AST: 109 U/L / GGT: x             RADIOLOGY & ADDITIONAL STUDIES:
Patient is a 63y old  Male who presents with a chief complaint of   HPI:  62 yo WM h/o Htn, PAfib s/p cardiac ablation, Prediabetes, presented with abdominal pain for the past 3 days; pain was severe 8/10 located in the epigastric and LUQ/LLQ areas. Pain was aggravated by food intake.  -found to have acute pancreatitis    5/4/2018-  Pt feeling much better with post surgery pain.  OOB ambulating-tolerating diet.  Denies any n/v.    PAST MEDICAL & SURGICAL HISTORY:  Palpitations  Chest pain  HTN (hypertension)  PAC (premature atrial contraction)  Afib  H/O: knee surgery: bilateral  Cataract    MEDICATIONS  (STANDING):  aspirin enteric coated 81 milliGRAM(s) Oral daily  enoxaparin Injectable 40 milliGRAM(s) SubCutaneous daily  lactated ringers. 1000 milliLiter(s) (100 mL/Hr) IV Continuous <Continuous>    MEDICATIONS  (PRN):  HYDROmorphone  Injectable 1 milliGRAM(s) IV Push every 4 hours PRN Severe Pain  ondansetron Injectable 4 milliGRAM(s) IV Push every 6 hours PRN Nausea  oxyCODONE    5 mG/acetaminophen 325 mG 1 Tablet(s) Oral every 4 hours PRN Moderate Pain    Allergies    No Known Allergies    REVIEW OF SYSTEMS:  CONSTITUTIONAL: No weakness, fevers or chills  EYES/ENT: No visual changes;  No vertigo or throat pain   NECK: No pain or stiffness  RESPIRATORY: No cough, wheezing, hemoptysis; No shortness of breath  CARDIOVASCULAR: No chest pain or palpitations  GASTROINTESTINAL: As per HPI.  GENITOURINARY: No dysuria, frequency or hematuria  NEUROLOGICAL: No numbness or weakness  SKIN: No itching, burning, rashes, or lesions   PSYCH: Normal mood and affect  All other review of systems is negative unless indicated above.    Vital Signs Last 24 Hrs  T(C): 36.7 (04 May 2018 04:52), Max: 37.1 (03 May 2018 14:30)  T(F): 98 (04 May 2018 04:52), Max: 98.8 (03 May 2018 14:30)  HR: 85 (04 May 2018 04:52) (80 - 87)  BP: 129/77 (04 May 2018 04:52) (120/69 - 144/81)  BP(mean): --  RR: 18 (04 May 2018 04:52) (12 - 20)  SpO2: 96% (04 May 2018 04:52) (94% - 99%)    PHYSICAL EXAM:  Constitutional: NAD, well-developed  HEENT: MMM  Neck: No LAD  Respiratory: CTAB  Cardiovascular: S1 and S2, RRR, no M/G/R  Gastrointestinal: x4 lap surgical sites, with old stained blood, mild tenderness, ND, +BS.   Extremities: No peripheral edema  Vascular: 2+ peripheral pulses  Neurological: A/O x 3, no focal deficits  Psychiatric: Normal mood, normal affect  Skin: No rashes    LABS:                        11.6   9.52  )-----------( 157      ( 02 May 2018 07:44 )             36.5     05-02    139  |  107  |  10  ----------------------------<  105<H>  4.0   |  21<L>  |  0.81    Ca    8.8      02 May 2018 07:44    TPro  6.9  /  Alb  3.2<L>  /  TBili  3.6<H>  /  DBili  x   /  AST  109<H>  /  ALT  243<H>  /  AlkPhos  180<H>  05-02      LIVER FUNCTIONS - ( 02 May 2018 07:44 )  Alb: 3.2 g/dL / Pro: 6.9 gm/dL / ALK PHOS: 180 U/L / ALT: 243 U/L / AST: 109 U/L / GGT: x             RADIOLOGY & ADDITIONAL STUDIES:
64 yo WM h/o Htn, PAfib s/p cardiac ablation, Prediabetes, presented with abdominal pain for the past 3 days; pain was severe 8/10 located in the epigastric and LUQ/LLQ areas. Pain was aggravated by food intake.  -found to have acute pancreatitis and GB stones    5.1: +abdominal epigastric pain            REVIEW OF SYSTEMS:    CONSTITUTIONAL: No weakness, No fevers or chills  ENT: No ear ache, No sorethroat  NECK: No pain, No stiffness  RESPIRATORY: No cough, No wheezing, No hemoptysis; No dyspnea  CARDIOVASCULAR: No chest pain, No palpitations  GASTROINTESTINAL: No abd pain, No nausea, No vomiting, No hematemesis, No diarrhea or constipation. No melena, No hematochezia.  GENITOURINARY: No dysuria, No  hematuria  NEUROLOGICAL: No diplopia, No paresthesia, No motor dysfunction  MUSCULOSKELETAL: No arthralgia, No myalgia  SKIN: No rashes, or lesions   PSYCH: no anxiety, no suicidal ideation    All other review of systems is negative unless indicated above    Vital Signs Last 24 Hrs  T(C): 36.8 (01 May 2018 10:50), Max: 37.9 (30 Apr 2018 16:39)  T(F): 98.2 (01 May 2018 10:50), Max: 100.3 (30 Apr 2018 16:39)  HR: 88 (01 May 2018 10:50) (88 - 98)  BP: 121/69 (01 May 2018 10:50) (103/73 - 121/69)  BP(mean): --  RR: 18 (01 May 2018 10:50) (18 - 18)  SpO2: 95% (01 May 2018 10:50) (94% - 95%)    PHYSICAL EXAM:    GENERAL: NAD, Well nourished  HEENT:  NC/AT, EOMI, PERRLA, No scleral icterus, Moist mucous membranes  NECK: Supple, No JVD  CNS:  Alert & Oriented X3, Motor Strength 5/5 B/L upper and lower extremities; DTRs 2+ intact   LUNG: Normal Breath sounds, Clear to auscultation bilaterally, No rales, No rhonchi, No wheezing  HEART: RRR; No murmurs, No rubs  ABDOMEN: +BS, ST/ND, +TTP in epigastric area  GENITOURINARY: Voiding, Bladder not distended  EXTREMITIES:  2+ Peripheral Pulses, No clubbing, No cyanosis, No tibial edema  MUSCULOSKELTAL: Joints normal ROM, No TTP, No effusion  VAGINAL: deferred  SKIN: no rashes  RECTAL: deferred, not indicated  BREAST: deferred                          10.8   11.62 )-----------( 128      ( 01 May 2018 09:49 )             33.9     05-01    143  |  109<H>  |  11  ----------------------------<  111<H>  4.7   |  26  |  0.97    Ca    8.5      01 May 2018 09:49    TPro  6.3  /  Alb  3.1<L>  /  TBili  5.8<H>  /  DBili  x   /  AST  206<H>  /  ALT  324<H>  /  AlkPhos  163<H>  05-01    Vancomycin levels:   Cultures:     MEDICATIONS  (STANDING):  aspirin enteric coated 81 milliGRAM(s) Oral daily  enoxaparin Injectable 40 milliGRAM(s) SubCutaneous every 24 hours  metoprolol succinate  milliGRAM(s) Oral daily  pantoprazole    Tablet 40 milliGRAM(s) Oral before breakfast  sodium chloride 0.9%. 1000 milliLiter(s) (150 mL/Hr) IV Continuous <Continuous>    MEDICATIONS  (PRN):  aluminum hydroxide/magnesium hydroxide/simethicone Suspension 30 milliLiter(s) Oral every 4 hours PRN Dyspepsia  morphine  - Injectable 2 milliGRAM(s) IV Push every 4 hours PRN Moderate Pain (4 - 6)  morphine  - Injectable 6 milliGRAM(s) IV Push every 4 hours PRN Severe Pain (7 - 10)  ondansetron Injectable 4 milliGRAM(s) IV Push every 6 hours PRN Nausea      all labs reviewed  all imaging reviewed    Assessment and Plan:    1. Pancreatitis:  likely related to Gall Bladder Stones  NPO except meds  IV hydration   Antiemetics  MRCP to r/o CBD stones  GI evaluation  Surgical evaluation    2. Afib:  s/p ablation  cw BBlockdebra bob ASA
64 yo WM h/o Htn, PAfib s/p cardiac ablation, Prediabetes, presented with abdominal pain for the past 3 days; pain was severe 8/10 located in the epigastric and LUQ/LLQ areas. Pain was aggravated by food intake.  -found to have acute pancreatitis and GB stones    5.1: +abdominal epigastric pain  5.2: less abd pain today      REVIEW OF SYSTEMS:    CONSTITUTIONAL: No weakness, No fevers or chills  ENT: No ear ache, No sorethroat  NECK: No pain, No stiffness  RESPIRATORY: No cough, No wheezing, No hemoptysis; No dyspnea  CARDIOVASCULAR: No chest pain, No palpitations  GASTROINTESTINAL: No abd pain, No nausea, No vomiting, No hematemesis, No diarrhea or constipation. No melena, No hematochezia.  GENITOURINARY: No dysuria, No  hematuria  NEUROLOGICAL: No diplopia, No paresthesia, No motor dysfunction  MUSCULOSKELETAL: No arthralgia, No myalgia  SKIN: No rashes, or lesions   PSYCH: no anxiety, no suicidal ideation    All other review of systems is negative unless indicated above    Vital Signs Last 24 Hrs  T(C): 36.7 (02 May 2018 11:12), Max: 36.8 (02 May 2018 05:01)  T(F): 98 (02 May 2018 11:12), Max: 98.3 (02 May 2018 05:01)  HR: 87 (02 May 2018 11:12) (86 - 88)  BP: 127/76 (02 May 2018 11:12) (116/75 - 127/76)  RR: 18 (02 May 2018 11:12) (18 - 18)  SpO2: 94% (02 May 2018 11:12) (94% - 97%)    PHYSICAL EXAM:    GENERAL: NAD, Well nourished  HEENT:  NC/AT, EOMI, PERRLA, No scleral icterus, Moist mucous membranes  NECK: Supple, No JVD  CNS:  Alert & Oriented X3, Motor Strength 5/5 B/L upper and lower extremities; DTRs 2+ intact   LUNG: Normal Breath sounds, Clear to auscultation bilaterally, No rales, No rhonchi, No wheezing  HEART: RRR; No murmurs, No rubs  ABDOMEN: +BS, ST/ND, +TTP in epigastric area  GENITOURINARY: Voiding, Bladder not distended  EXTREMITIES:  2+ Peripheral Pulses, No clubbing, No cyanosis, No tibial edema  MUSCULOSKELTAL: Joints normal ROM, No TTP, No effusion  VAGINAL: deferred  SKIN: no rashes  RECTAL: deferred, not indicated  BREAST: deferred               Labs:                        11.6   9.52  )-----------( 157      ( 02 May 2018 07:44 )             36.5     05-02    139  |  107  |  10  ----------------------------<  105<H>  4.0   |  21<L>  |  0.81    Ca    8.8      02 May 2018 07:44    TPro  6.9  /  Alb  3.2<L>  /  TBili  3.6<H>  /  DBili  x   /  AST  109<H>  /  ALT  243<H>  /  AlkPhos  180<H>  05-02           Cultures:     MEDICATIONS  (STANDING):  aspirin enteric coated 81 milliGRAM(s) Oral daily  enoxaparin Injectable 40 milliGRAM(s) SubCutaneous every 24 hours  metoprolol succinate  milliGRAM(s) Oral daily  pantoprazole    Tablet 40 milliGRAM(s) Oral before breakfast  sodium chloride 0.9%. 1000 milliLiter(s) (150 mL/Hr) IV Continuous <Continuous>    MEDICATIONS  (PRN):  aluminum hydroxide/magnesium hydroxide/simethicone Suspension 30 milliLiter(s) Oral every 4 hours PRN Dyspepsia  morphine  - Injectable 2 milliGRAM(s) IV Push every 4 hours PRN Moderate Pain (4 - 6)  morphine  - Injectable 6 milliGRAM(s) IV Push every 4 hours PRN Severe Pain (7 - 10)  ondansetron Injectable 4 milliGRAM(s) IV Push every 6 hours PRN Nausea      all labs reviewed  all imaging reviewed    Assessment and Plan:    1. Pancreatitis:  likely related to Gall Bladder Stones  NPO except meds  IV hydration, IV PPI  Antiemetics  MRCP: no cbd stones  GI evaluation appreciated  Surgical evaluation pending    2. Afib:  s/p ablation  paulino bob ASA
64 yo WM h/o Htn, PAfib s/p cardiac ablation, Prediabetes, presented with abdominal pain for the past 3 days; pain was severe 8/10 located in the epigastric and LUQ/LLQ areas. Pain was aggravated by food intake.  -found to have acute pancreatitis and GB stones    5.1: +abdominal epigastric pain  5.2: less abd pain today  5.3: no abd pain, no n/v      REVIEW OF SYSTEMS:    CONSTITUTIONAL: No weakness, No fevers or chills  ENT: No ear ache, No sorethroat  NECK: No pain, No stiffness  RESPIRATORY: No cough, No wheezing, No hemoptysis; No dyspnea  CARDIOVASCULAR: No chest pain, No palpitations  GASTROINTESTINAL: No abd pain, No nausea, No vomiting, No hematemesis, No diarrhea or constipation. No melena, No hematochezia.  GENITOURINARY: No dysuria, No  hematuria  NEUROLOGICAL: No diplopia, No paresthesia, No motor dysfunction  MUSCULOSKELETAL: No arthralgia, No myalgia  SKIN: No rashes, or lesions   PSYCH: no anxiety, no suicidal ideation    All other review of systems is negative unless indicated above    Vital Signs Last 24 Hrs  T(C): 37 (03 May 2018 10:53), Max: 37 (03 May 2018 10:53)  T(F): 98.6 (03 May 2018 10:53), Max: 98.6 (03 May 2018 10:53)  HR: 80 (03 May 2018 10:53) (80 - 84)  BP: 132/82 (03 May 2018 10:53) (126/71 - 132/82)  RR: 17 (03 May 2018 10:53) (17 - 18)  SpO2: 98% (03 May 2018 10:53) (96% - 99%)    PHYSICAL EXAM:    GENERAL: NAD, Well nourished  HEENT:  NC/AT, EOMI, PERRLA, No scleral icterus, Moist mucous membranes  NECK: Supple, No JVD  CNS:  Alert & Oriented X3, Motor Strength 5/5 B/L upper and lower extremities; DTRs 2+ intact   LUNG: Normal Breath sounds, Clear to auscultation bilaterally, No rales, No rhonchi, No wheezing  HEART: RRR; No murmurs, No rubs  ABDOMEN: +BS, ST/ND, no TTP in epigastric area  GENITOURINARY: Voiding, Bladder not distended  EXTREMITIES:  2+ Peripheral Pulses, No clubbing, No cyanosis, No tibial edema  MUSCULOSKELTAL: Joints normal ROM, No TTP, No effusion  VAGINAL: deferred  SKIN: no rashes  RECTAL: deferred, not indicated  BREAST: deferred               Labs:                        11.6   9.52  )-----------( 157      ( 02 May 2018 07:44 )             36.5     05-02    139  |  107  |  10  ----------------------------<  105<H>  4.0   |  21<L>  |  0.81    Ca    8.8      02 May 2018 07:44    TPro  6.9  /  Alb  3.2<L>  /  TBili  3.6<H>  /  DBili  x   /  AST  109<H>  /  ALT  243<H>  /  AlkPhos  180<H>  05-02           Cultures:     MEDICATIONS  (STANDING):  aspirin enteric coated 81 milliGRAM(s) Oral daily  enoxaparin Injectable 40 milliGRAM(s) SubCutaneous every 24 hours  metoprolol succinate  milliGRAM(s) Oral daily  pantoprazole    Tablet 40 milliGRAM(s) Oral before breakfast  sodium chloride 0.9%. 1000 milliLiter(s) (150 mL/Hr) IV Continuous <Continuous>    MEDICATIONS  (PRN):  aluminum hydroxide/magnesium hydroxide/simethicone Suspension 30 milliLiter(s) Oral every 4 hours PRN Dyspepsia  morphine  - Injectable 2 milliGRAM(s) IV Push every 4 hours PRN Moderate Pain (4 - 6)  morphine  - Injectable 6 milliGRAM(s) IV Push every 4 hours PRN Severe Pain (7 - 10)  ondansetron Injectable 4 milliGRAM(s) IV Push every 6 hours PRN Nausea      all labs reviewed  all imaging reviewed    Assessment and Plan:    1. Pancreatitis:  likely related to Gall Bladder Stones  NPO except meds  IV hydration, IV PPI  Antiemetics  MRCP: no cbd stones  GI evaluation appreciated  Surgical evaluation noted: for OR today    2. Afib:  s/p ablation  cw BBlockers   cw ASA  Ekg: NSR    Patient is medically cleared for surgery  cw perioperative BBlockers
Patient is a 63y old  Male who presents with a chief complaint of   HPI:  62 yo WM h/o Htn, PAfib s/p cardiac ablation, Prediabetes, presented with abdominal pain for the past 3 days; pain was severe 8/10 located in the epigastric and LUQ/LLQ areas. Pain was aggravated by food intake.  -found to have acute pancreatitis    5/2/2018-  Pt reports feeling better.  Improving abdominal tenderness.  C/o backache from bed.  Denies any n/v.    PAST MEDICAL & SURGICAL HISTORY:  Palpitations  Chest pain  HTN (hypertension)  PAC (premature atrial contraction)  Afib  H/O: knee surgery: bilateral  Cataract    MEDICATIONS  (STANDING):  aspirin enteric coated 81 milliGRAM(s) Oral daily  enoxaparin Injectable 40 milliGRAM(s) SubCutaneous every 24 hours  metoprolol succinate  milliGRAM(s) Oral daily  pantoprazole    Tablet 40 milliGRAM(s) Oral before breakfast  sodium chloride 0.9%. 1000 milliLiter(s) (150 mL/Hr) IV Continuous <Continuous>    MEDICATIONS  (PRN):  aluminum hydroxide/magnesium hydroxide/simethicone Suspension 30 milliLiter(s) Oral every 4 hours PRN Dyspepsia  morphine  - Injectable 2 milliGRAM(s) IV Push every 4 hours PRN Moderate Pain (4 - 6)  morphine  - Injectable 6 milliGRAM(s) IV Push every 4 hours PRN Severe Pain (7 - 10)  ondansetron Injectable 4 milliGRAM(s) IV Push every 6 hours PRN Nausea    Allergies  No Known Allergies    REVIEW OF SYSTEMS:  CONSTITUTIONAL: No weakness, fevers or chills  EYES/ENT: No visual changes;  No vertigo or throat pain   NECK: No pain or stiffness  RESPIRATORY: No cough, wheezing, hemoptysis; No shortness of breath  CARDIOVASCULAR: No chest pain or palpitations  GASTROINTESTINAL: As per HPI.  GENITOURINARY: No dysuria, frequency or hematuria  NEUROLOGICAL: No numbness or weakness  SKIN: No itching, burning, rashes, or lesions   PSYCH: Normal mood and affect  All other review of systems is negative unless indicated above.  Vital Signs Last 24 Hrs  T(C): 36.8 (02 May 2018 05:01), Max: 36.8 (01 May 2018 10:50)  T(F): 98.3 (02 May 2018 05:01), Max: 98.3 (02 May 2018 05:01)  HR: 86 (02 May 2018 05:01) (86 - 88)  BP: 126/80 (02 May 2018 05:01) (116/75 - 126/80)  BP(mean): --  RR: 18 (02 May 2018 05:01) (18 - 18)  SpO2: 97% (02 May 2018 05:01) (95% - 97%)    PHYSICAL EXAM:  Constitutional: NAD, well-developed  HEENT: MMM  Neck: No LAD  Respiratory: CTAB  Cardiovascular: S1 and S2, RRR, no M/G/R  Gastrointestinal: epigastric tenderness, +BS, ND.  Extremities: No peripheral edema  Vascular: 2+ peripheral pulses  Neurological: A/O x 3, no focal deficits  Psychiatric: Normal mood, normal affect  Skin: No rashes    LABS:                        11.6   9.52  )-----------( 157      ( 02 May 2018 07:44 )             36.5     05-01    143  |  109<H>  |  11  ----------------------------<  111<H>  4.7   |  26  |  0.97    Ca    8.5      01 May 2018 09:49    TPro  6.3  /  Alb  3.1<L>  /  TBili  5.8<H>  /  DBili  x   /  AST  206<H>  /  ALT  324<H>  /  AlkPhos  163<H>  05-01      LIVER FUNCTIONS - ( 01 May 2018 09:49 )  Alb: 3.1 g/dL / Pro: 6.3 gm/dL / ALK PHOS: 163 U/L / ALT: 324 U/L / AST: 206 U/L / GGT: x             RADIOLOGY & ADDITIONAL STUDIES:
Patient is a 63y old  Male who presents with a chief complaint of   HPI:  64 yo WM h/o Htn, PAfib s/p cardiac ablation, Prediabetes, presented with abdominal pain for the past 3 days; pain was severe 8/10 located in the epigastric and LUQ/LLQ areas. Pain was aggravated by food intake.  -found to have acute pancreatitis    5/1/2018-  Pt reports feeling better.  Still with abdominal pain but improving.  "little diarrhea now."  Denies n/v or rectal bleeding.     PMHX: as above  PSHx: cardiac ablation  FamHx: father had DM, mother CABG  SocHx: no Etoh, no drugs, no smoking    PAST MEDICAL & SURGICAL HISTORY:  Palpitations  Chest pain  HTN (hypertension)  PAC (premature atrial contraction)  Afib  H/O: knee surgery: bilateral  Cataract    MEDICATIONS  (STANDING):  aspirin enteric coated 81 milliGRAM(s) Oral daily  enoxaparin Injectable 40 milliGRAM(s) SubCutaneous every 24 hours  metoprolol succinate  milliGRAM(s) Oral daily  pantoprazole    Tablet 40 milliGRAM(s) Oral before breakfast  sodium chloride 0.9%. 1000 milliLiter(s) (150 mL/Hr) IV Continuous <Continuous>    MEDICATIONS  (PRN):  aluminum hydroxide/magnesium hydroxide/simethicone Suspension 30 milliLiter(s) Oral every 4 hours PRN Dyspepsia  morphine  - Injectable 2 milliGRAM(s) IV Push every 4 hours PRN Moderate Pain (4 - 6)  morphine  - Injectable 6 milliGRAM(s) IV Push every 4 hours PRN Severe Pain (7 - 10)  ondansetron Injectable 4 milliGRAM(s) IV Push every 6 hours PRN Nausea    Allergies    No Known Allergies    REVIEW OF SYSTEMS:  CONSTITUTIONAL: No weakness, fevers or chills  EYES/ENT: No visual changes;  No vertigo or throat pain   NECK: No pain or stiffness  RESPIRATORY: No cough, wheezing, hemoptysis; No shortness of breath  CARDIOVASCULAR: No chest pain or palpitations  GASTROINTESTINAL: As per HPI  GENITOURINARY: No dysuria, frequency or hematuria  NEUROLOGICAL: No numbness or weakness  SKIN: No itching, burning, rashes, or lesions   PSYCH: Normal mood and affect  All other review of systems is negative unless indicated above.    Vital Signs Last 24 Hrs  T(C): 37.1 (01 May 2018 04:34), Max: 37.9 (30 Apr 2018 16:39)  T(F): 98.8 (01 May 2018 04:34), Max: 100.3 (30 Apr 2018 16:39)  HR: 90 (01 May 2018 04:34) (90 - 100)  BP: 103/73 (01 May 2018 04:34) (102/60 - 112/61)  BP(mean): --  RR: 18 (01 May 2018 04:34) (18 - 18)  SpO2: 94% (01 May 2018 04:34) (94% - 94%)    PHYSICAL EXAM:  Constitutional: Appears uncomfortable, in NAD.  Respiratory: CTAB  Cardiovascular: S1 and S2, RRR, no M/G/R  Gastrointestinal: Epigastric tenderness, ND, +BS.   Extremities: No peripheral edema  Vascular: 2+ peripheral pulses  Neurological: A/O x 3, no focal deficits  Psychiatric: Normal mood, normal affect  Skin: No rashes    LABS:                        12.6   15.48 )-----------( 162      ( 30 Apr 2018 00:01 )             39.8     04-30    141  |  106  |  20  ----------------------------<  121<H>  4.2   |  25  |  1.01    Ca    9.0      30 Apr 2018 00:01    TPro  7.3  /  Alb  3.9  /  TBili  2.0<H>  /  DBili  x   /  AST  79<H>  /  ALT  52  /  AlkPhos  117  04-30    PT/INR - ( 30 Apr 2018 00:01 )   PT: 11.4 sec;   INR: 1.05 ratio         PTT - ( 30 Apr 2018 00:01 )  PTT:25.8 sec  LIVER FUNCTIONS - ( 30 Apr 2018 00:01 )  Alb: 3.9 g/dL / Pro: 7.3 gm/dL / ALK PHOS: 117 U/L / ALT: 52 U/L / AST: 79 U/L / GGT: x             RADIOLOGY & ADDITIONAL STUDIES:

## 2018-05-04 NOTE — DISCHARGE NOTE ADULT - MEDICATION SUMMARY - MEDICATIONS TO STOP TAKING
I will STOP taking the medications listed below when I get home from the hospital:    acetaminophen 325 mg oral tablet  -- 2 tab(s) by mouth every 6 hours, As needed, Moderate Pain (4 - 6)    apixaban 5 mg oral tablet  -- 1 tab(s) by mouth 2 times a day    colchicine 0.6 mg oral tablet  -- 1 tab(s) by mouth once a day x 90 days, then STOP.   -- Do not take this drug if you are pregnant.  It is very important that you take or use this exactly as directed.  Do not skip doses or discontinue unless directed by your doctor.

## 2018-05-04 NOTE — DISCHARGE NOTE ADULT - MEDICATION SUMMARY - MEDICATIONS TO TAKE
I will START or STAY ON the medications listed below when I get home from the hospital:    oxyCODONE 5 mg oral capsule  -- 1 cap(s) by mouth 3 times a day, As Needed -for anxiety - for moderate pain MDD:3tb   -- Caution federal law prohibits the transfer of this drug to any person other  than the person for whom it was prescribed.  It is very important that you take or use this exactly as directed.  Do not skip doses or discontinue unless directed by your doctor.  May cause drowsiness.  Alcohol may intensify this effect.  Use care when operating dangerous machinery.  This prescription cannot be refilled.  Using more of this medication than prescribed may cause serious breathing problems.    -- Indication: For pain    metoprolol succinate 100 mg oral tablet, extended release  -- 1 tab(s) by mouth once a day  -- Indication: For Afib

## 2018-05-04 NOTE — DISCHARGE NOTE ADULT - HOSPITAL COURSE
64 yo WM h/o Htn, PAfib s/p cardiac ablation, Prediabetes, presented with abdominal pain for the past 3 days; pain was severe 8/10 located in the epigastric and LUQ/LLQ areas. Pain was aggravated by food intake.  -found to have acute pancreatitis and GB stones    5.4: pod#1, tolerated diet      REVIEW OF SYSTEMS:    CONSTITUTIONAL: No weakness, No fevers or chills  ENT: No ear ache, No sorethroat  NECK: No pain, No stiffness  RESPIRATORY: No cough, No wheezing, No hemoptysis; No dyspnea  CARDIOVASCULAR: No chest pain, No palpitations  GASTROINTESTINAL: No abd pain, No nausea, No vomiting, No hematemesis, No diarrhea or constipation. No melena, No hematochezia.  GENITOURINARY: No dysuria, No  hematuria  NEUROLOGICAL: No diplopia, No paresthesia, No motor dysfunction  MUSCULOSKELETAL: No arthralgia, No myalgia  SKIN: No rashes, or lesions   PSYCH: no anxiety, no suicidal ideation    All other review of systems is negative unless indicated above    Vital Signs Last 24 Hrs  T(C): 37 (03 May 2018 10:53), Max: 37 (03 May 2018 10:53)  T(F): 98.6 (03 May 2018 10:53), Max: 98.6 (03 May 2018 10:53)  HR: 80 (03 May 2018 10:53) (80 - 84)  BP: 132/82 (03 May 2018 10:53) (126/71 - 132/82)  RR: 17 (03 May 2018 10:53) (17 - 18)  SpO2: 98% (03 May 2018 10:53) (96% - 99%)    PHYSICAL EXAM:    GENERAL: NAD, Well nourished  HEENT:  NC/AT, EOMI, PERRLA, No scleral icterus, Moist mucous membranes  NECK: Supple, No JVD  CNS:  Alert & Oriented X3, Motor Strength 5/5 B/L upper and lower extremities; DTRs 2+ intact   LUNG: Normal Breath sounds, Clear to auscultation bilaterally, No rales, No rhonchi, No wheezing  HEART: RRR; No murmurs, No rubs  ABDOMEN: +BS, ST/ND, no TTP in epigastric area  GENITOURINARY: Voiding, Bladder not distended  EXTREMITIES:  2+ Peripheral Pulses, No clubbing, No cyanosis, No tibial edema  MUSCULOSKELTAL: Joints normal ROM, No TTP, No effusion  VAGINAL: deferred  SKIN: no rashes  RECTAL: deferred, not indicated  BREAST: deferred                  all labs reviewed  all imaging reviewed    Assessment and Plan:    1. Pancreatitis:  likely related to Gall Bladder Stones  s/c lap cholecystectomy  f/u with surgeon as outpt    2. Afib:  s/p ablation  cw BBlockers   cw ASA  Ekg: NSR    3. Abn LFTs:  repeat in 2 weeks with PCP    d/c home, time 35min

## 2018-05-04 NOTE — DISCHARGE NOTE ADULT - PATIENT PORTAL LINK FT
You can access the [facility] Patient Portal, offered by [facility], by registering with the following website: [email or web address]

## 2018-05-04 NOTE — DISCHARGE NOTE ADULT - CARE PROVIDER_API CALL
Dwayne Atwood), Surgery  [address] [place] [address]  Phone: [phone]  Fax: [phone]    Ruben Coughlin), Family Medicine  [address] [place] [address]  Phone: [phone]  Fax: [phone]

## 2018-05-07 LAB — SURGICAL PATHOLOGY FINAL REPORT - CH: SIGNIFICANT CHANGE UP

## 2018-05-11 DIAGNOSIS — I49.1 ATRIAL PREMATURE DEPOLARIZATION: ICD-10-CM

## 2018-05-11 DIAGNOSIS — R35.0 FREQUENCY OF MICTURITION: ICD-10-CM

## 2018-05-11 DIAGNOSIS — I10 ESSENTIAL (PRIMARY) HYPERTENSION: ICD-10-CM

## 2018-05-11 DIAGNOSIS — I48.0 PAROXYSMAL ATRIAL FIBRILLATION: ICD-10-CM

## 2018-05-11 DIAGNOSIS — R10.9 UNSPECIFIED ABDOMINAL PAIN: ICD-10-CM

## 2018-05-11 DIAGNOSIS — N40.0 BENIGN PROSTATIC HYPERPLASIA WITHOUT LOWER URINARY TRACT SYMPTOMS: ICD-10-CM

## 2018-05-11 DIAGNOSIS — R73.03 PREDIABETES: ICD-10-CM

## 2018-05-11 DIAGNOSIS — K85.10 BILIARY ACUTE PANCREATITIS WITHOUT NECROSIS OR INFECTION: ICD-10-CM

## 2018-05-11 DIAGNOSIS — K21.9 GASTRO-ESOPHAGEAL REFLUX DISEASE WITHOUT ESOPHAGITIS: ICD-10-CM

## 2018-05-11 DIAGNOSIS — Z79.01 LONG TERM (CURRENT) USE OF ANTICOAGULANTS: ICD-10-CM

## 2018-05-11 DIAGNOSIS — Z79.82 LONG TERM (CURRENT) USE OF ASPIRIN: ICD-10-CM

## 2019-02-09 PROBLEM — I10 ESSENTIAL (PRIMARY) HYPERTENSION: Chronic | Status: ACTIVE | Noted: 2017-10-24

## 2019-02-09 PROBLEM — R07.9 CHEST PAIN, UNSPECIFIED: Chronic | Status: ACTIVE | Noted: 2017-10-24

## 2019-02-09 PROBLEM — I48.91 UNSPECIFIED ATRIAL FIBRILLATION: Chronic | Status: ACTIVE | Noted: 2017-06-27

## 2019-02-09 PROBLEM — I49.1 ATRIAL PREMATURE DEPOLARIZATION: Chronic | Status: ACTIVE | Noted: 2017-10-24

## 2019-02-09 PROBLEM — R00.2 PALPITATIONS: Chronic | Status: ACTIVE | Noted: 2017-10-24

## 2019-02-14 ENCOUNTER — RECORD ABSTRACTING (OUTPATIENT)
Age: 64
End: 2019-02-14

## 2019-02-14 DIAGNOSIS — M25.561 PAIN IN RIGHT KNEE: ICD-10-CM

## 2019-03-20 ENCOUNTER — APPOINTMENT (OUTPATIENT)
Dept: ORTHOPEDIC SURGERY | Facility: CLINIC | Age: 64
End: 2019-03-20
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
## 2021-02-02 ENCOUNTER — EMERGENCY (EMERGENCY)
Facility: HOSPITAL | Age: 66
LOS: 0 days | Discharge: HOME | End: 2021-02-02
Attending: STUDENT IN AN ORGANIZED HEALTH CARE EDUCATION/TRAINING PROGRAM
Payer: MEDICARE

## 2021-02-02 VITALS — WEIGHT: 184.97 LBS | HEIGHT: 68 IN

## 2021-02-02 VITALS
RESPIRATION RATE: 18 BRPM | SYSTOLIC BLOOD PRESSURE: 126 MMHG | OXYGEN SATURATION: 100 % | DIASTOLIC BLOOD PRESSURE: 76 MMHG | HEART RATE: 86 BPM

## 2021-02-02 DIAGNOSIS — Z96.653 PRESENCE OF ARTIFICIAL KNEE JOINT, BILATERAL: ICD-10-CM

## 2021-02-02 DIAGNOSIS — Z98.41 CATARACT EXTRACTION STATUS, RIGHT EYE: ICD-10-CM

## 2021-02-02 DIAGNOSIS — Z98.890 OTHER SPECIFIED POSTPROCEDURAL STATES: Chronic | ICD-10-CM

## 2021-02-02 DIAGNOSIS — R00.2 PALPITATIONS: ICD-10-CM

## 2021-02-02 DIAGNOSIS — I48.91 UNSPECIFIED ATRIAL FIBRILLATION: ICD-10-CM

## 2021-02-02 DIAGNOSIS — Z79.891 LONG TERM (CURRENT) USE OF OPIATE ANALGESIC: ICD-10-CM

## 2021-02-02 DIAGNOSIS — Z90.49 ACQUIRED ABSENCE OF OTHER SPECIFIED PARTS OF DIGESTIVE TRACT: ICD-10-CM

## 2021-02-02 DIAGNOSIS — Z98.42 CATARACT EXTRACTION STATUS, LEFT EYE: ICD-10-CM

## 2021-02-02 DIAGNOSIS — I49.1 ATRIAL PREMATURE DEPOLARIZATION: ICD-10-CM

## 2021-02-02 DIAGNOSIS — R10.9 UNSPECIFIED ABDOMINAL PAIN: ICD-10-CM

## 2021-02-02 DIAGNOSIS — R19.7 DIARRHEA, UNSPECIFIED: ICD-10-CM

## 2021-02-02 DIAGNOSIS — I10 ESSENTIAL (PRIMARY) HYPERTENSION: ICD-10-CM

## 2021-02-02 DIAGNOSIS — R19.5 OTHER FECAL ABNORMALITIES: ICD-10-CM

## 2021-02-02 DIAGNOSIS — H26.9 UNSPECIFIED CATARACT: Chronic | ICD-10-CM

## 2021-02-02 LAB
ALBUMIN SERPL ELPH-MCNC: 4.2 G/DL — SIGNIFICANT CHANGE UP (ref 3.3–5)
ALP SERPL-CCNC: 145 U/L — HIGH (ref 40–120)
ALT FLD-CCNC: 54 U/L — SIGNIFICANT CHANGE UP (ref 12–78)
ANION GAP SERPL CALC-SCNC: 7 MMOL/L — SIGNIFICANT CHANGE UP (ref 5–17)
APPEARANCE UR: CLEAR — SIGNIFICANT CHANGE UP
AST SERPL-CCNC: 36 U/L — SIGNIFICANT CHANGE UP (ref 15–37)
BASOPHILS # BLD AUTO: 0.08 K/UL — SIGNIFICANT CHANGE UP (ref 0–0.2)
BASOPHILS NFR BLD AUTO: 0.6 % — SIGNIFICANT CHANGE UP (ref 0–2)
BILIRUB SERPL-MCNC: 2.5 MG/DL — HIGH (ref 0.2–1.2)
BILIRUB UR-MCNC: ABNORMAL
BUN SERPL-MCNC: 13 MG/DL — SIGNIFICANT CHANGE UP (ref 7–23)
CALCIUM SERPL-MCNC: 9.4 MG/DL — SIGNIFICANT CHANGE UP (ref 8.5–10.1)
CHLORIDE SERPL-SCNC: 108 MMOL/L — SIGNIFICANT CHANGE UP (ref 96–108)
CO2 SERPL-SCNC: 25 MMOL/L — SIGNIFICANT CHANGE UP (ref 22–31)
COLOR SPEC: ABNORMAL
CREAT SERPL-MCNC: 1.34 MG/DL — HIGH (ref 0.5–1.3)
DIFF PNL FLD: NEGATIVE — SIGNIFICANT CHANGE UP
EOSINOPHIL # BLD AUTO: 0.3 K/UL — SIGNIFICANT CHANGE UP (ref 0–0.5)
EOSINOPHIL NFR BLD AUTO: 2.3 % — SIGNIFICANT CHANGE UP (ref 0–6)
GLUCOSE SERPL-MCNC: 130 MG/DL — HIGH (ref 70–99)
GLUCOSE UR QL: NEGATIVE MG/DL — SIGNIFICANT CHANGE UP
HCT VFR BLD CALC: 40.8 % — SIGNIFICANT CHANGE UP (ref 39–50)
HGB BLD-MCNC: 13 G/DL — SIGNIFICANT CHANGE UP (ref 13–17)
IMM GRANULOCYTES NFR BLD AUTO: 1.3 % — SIGNIFICANT CHANGE UP (ref 0–1.5)
KETONES UR-MCNC: ABNORMAL
LEUKOCYTE ESTERASE UR-ACNC: ABNORMAL
LIDOCAIN IGE QN: 90 U/L — SIGNIFICANT CHANGE UP (ref 73–393)
LYMPHOCYTES # BLD AUTO: 1.77 K/UL — SIGNIFICANT CHANGE UP (ref 1–3.3)
LYMPHOCYTES # BLD AUTO: 13.8 % — SIGNIFICANT CHANGE UP (ref 13–44)
MCHC RBC-ENTMCNC: 20.3 PG — LOW (ref 27–34)
MCHC RBC-ENTMCNC: 31.9 GM/DL — LOW (ref 32–36)
MCV RBC AUTO: 63.8 FL — LOW (ref 80–100)
MONOCYTES # BLD AUTO: 0.9 K/UL — SIGNIFICANT CHANGE UP (ref 0–0.9)
MONOCYTES NFR BLD AUTO: 7 % — SIGNIFICANT CHANGE UP (ref 2–14)
NEUTROPHILS # BLD AUTO: 9.58 K/UL — HIGH (ref 1.8–7.4)
NEUTROPHILS NFR BLD AUTO: 75 % — SIGNIFICANT CHANGE UP (ref 43–77)
NITRITE UR-MCNC: NEGATIVE — SIGNIFICANT CHANGE UP
PH UR: 5 — SIGNIFICANT CHANGE UP (ref 5–8)
PLATELET # BLD AUTO: 231 K/UL — SIGNIFICANT CHANGE UP (ref 150–400)
POTASSIUM SERPL-MCNC: 3.4 MMOL/L — LOW (ref 3.5–5.3)
POTASSIUM SERPL-SCNC: 3.4 MMOL/L — LOW (ref 3.5–5.3)
PROT SERPL-MCNC: 8.1 GM/DL — SIGNIFICANT CHANGE UP (ref 6–8.3)
PROT UR-MCNC: 15 MG/DL
RBC # BLD: 6.4 M/UL — HIGH (ref 4.2–5.8)
RBC # FLD: 18.3 % — HIGH (ref 10.3–14.5)
SARS-COV-2 RNA SPEC QL NAA+PROBE: SIGNIFICANT CHANGE UP
SODIUM SERPL-SCNC: 140 MMOL/L — SIGNIFICANT CHANGE UP (ref 135–145)
SP GR SPEC: 1.02 — SIGNIFICANT CHANGE UP (ref 1.01–1.02)
UROBILINOGEN FLD QL: 8 MG/DL
WBC # BLD: 12.79 K/UL — HIGH (ref 3.8–10.5)
WBC # FLD AUTO: 12.79 K/UL — HIGH (ref 3.8–10.5)

## 2021-02-02 PROCEDURE — 74177 CT ABD & PELVIS W/CONTRAST: CPT

## 2021-02-02 PROCEDURE — 81001 URINALYSIS AUTO W/SCOPE: CPT

## 2021-02-02 PROCEDURE — U0003: CPT

## 2021-02-02 PROCEDURE — 83690 ASSAY OF LIPASE: CPT

## 2021-02-02 PROCEDURE — 36415 COLL VENOUS BLD VENIPUNCTURE: CPT

## 2021-02-02 PROCEDURE — 96360 HYDRATION IV INFUSION INIT: CPT | Mod: XU

## 2021-02-02 PROCEDURE — 80053 COMPREHEN METABOLIC PANEL: CPT

## 2021-02-02 PROCEDURE — 99284 EMERGENCY DEPT VISIT MOD MDM: CPT | Mod: 25

## 2021-02-02 PROCEDURE — 85025 COMPLETE CBC W/AUTO DIFF WBC: CPT

## 2021-02-02 PROCEDURE — 99284 EMERGENCY DEPT VISIT MOD MDM: CPT

## 2021-02-02 PROCEDURE — 74177 CT ABD & PELVIS W/CONTRAST: CPT | Mod: 26

## 2021-02-02 PROCEDURE — 96361 HYDRATE IV INFUSION ADD-ON: CPT

## 2021-02-02 PROCEDURE — U0005: CPT

## 2021-02-02 PROCEDURE — 87086 URINE CULTURE/COLONY COUNT: CPT

## 2021-02-02 RX ORDER — SODIUM CHLORIDE 9 MG/ML
1000 INJECTION INTRAMUSCULAR; INTRAVENOUS; SUBCUTANEOUS ONCE
Refills: 0 | Status: COMPLETED | OUTPATIENT
Start: 2021-02-02 | End: 2021-02-02

## 2021-02-02 RX ORDER — POTASSIUM CHLORIDE 20 MEQ
40 PACKET (EA) ORAL ONCE
Refills: 0 | Status: COMPLETED | OUTPATIENT
Start: 2021-02-02 | End: 2021-02-02

## 2021-02-02 RX ADMIN — Medication 40 MILLIEQUIVALENT(S): at 12:08

## 2021-02-02 RX ADMIN — SODIUM CHLORIDE 1000 MILLILITER(S): 9 INJECTION INTRAMUSCULAR; INTRAVENOUS; SUBCUTANEOUS at 09:39

## 2021-02-02 RX ADMIN — SODIUM CHLORIDE 1000 MILLILITER(S): 9 INJECTION INTRAMUSCULAR; INTRAVENOUS; SUBCUTANEOUS at 12:05

## 2021-02-02 NOTE — ED PROVIDER NOTE - PROGRESS NOTE DETAILS
Corey DO: brown stool; guaiac negative; lot: [id number]; exp: 9/30/21; qs positive Corey DO: Patient with leukocytosis- t/c stress response; no fever; no cough; ua neg; culture pending; patient notified of all lab results- given copy; instructed to f/u with pmd regarding abnormal lfts- elevated tbili; need to close outpatient f/u; strict return precautions given. Corey JENNINGS: I spoke to patient's pmd- Dr. Coughlin prior to d/c home; would like patient to have covid swab; made aware of work up and results at this time; his office will contact patient with covid results and to schedule outpatient appt. Patient made aware of f/u plan at this time.

## 2021-02-02 NOTE — ED ADULT TRIAGE NOTE - CHIEF COMPLAINT QUOTE
Ambulatory from home complaining of lower abdominal pain and diarrhea x4 days. Spoke with his PCP and was instructed to take pepto without any relief, last dose yesterday. Denies blood in stool, N/V, CP or sick contacts.

## 2021-02-02 NOTE — ED ADULT NURSE NOTE - OBJECTIVE STATEMENT
Pt reports diffuse abdominal pain with difficulty tolerating po. Pt denies vomiting, but reports diarrhea.  Denies any visible bleeding.  Pt reports intermittent difficulty voiding with appt for urologist, but denies any increase in urinary symptoms from his more chronic urinary concerns.  Pt denies f/c/c.

## 2021-02-02 NOTE — ED PROVIDER NOTE - CARE PROVIDER_API CALL
López Smith)  Gastroenterology; Internal Medicine  [address] [place] [address]  Phone: [phone]  Fax: [phone]  Follow Up Time:

## 2021-02-02 NOTE — ED PROVIDER NOTE - PATIENT PORTAL LINK FT
You can access the FollowMyHealth Patient Portal offered by [facility] by registering at the following website: [email or web address]. By joining [employer]’s FollowMyHealth portal, you will also be able to view your health information using other applications (apps) compatible with our system.

## 2021-02-02 NOTE — ED PROVIDER NOTE - OBJECTIVE STATEMENT
66 y/o male with a PMHx of A-fib, PAC, HTN, s/p cholecystectomy, presents to the ED ambulatory from home c/o 4 days of diarrhea. Reports 2-3 episodes of diarrhea a day. +decrease PO intake. Pt with abdominal "soreness". Pt f/u with PMD and told him to take Pepto Bismol but reports after taking Pepto Bismol noticed stool was black afterwards. No fever, chills. No recent abx. No recent travel. No cp, sob, vomiting.

## 2021-02-02 NOTE — ED ADULT NURSE NOTE - DOES PATIENT HAVE ADVANCE DIRECTIVE
Yes Duration Of Freeze Thaw-Cycle (Seconds): 5 Render Note In Bullet Format When Appropriate: No Number Of Freeze-Thaw Cycles: 2 freeze-thaw cycles Post-Care Instructions: I reviewed with the patient in detail post-care instructions. Patient is to wear sunprotection, and avoid picking at any of the treated lesions. Pt may apply Vaseline to crusted or scabbing areas. Consent: The patient's verbal consent was obtained including but not limited to risks of crusting, scabbing, blistering, scarring, darker or lighter pigmentary change, recurrence, incomplete removal and infection. Detail Level: Simple

## 2021-02-03 LAB
CULTURE RESULTS: SIGNIFICANT CHANGE UP
SPECIMEN SOURCE: SIGNIFICANT CHANGE UP

## 2021-03-23 ENCOUNTER — APPOINTMENT (OUTPATIENT)
Dept: DERMATOLOGY | Facility: CLINIC | Age: 66
End: 2021-03-23

## 2021-03-29 ENCOUNTER — APPOINTMENT (OUTPATIENT)
Dept: DERMATOLOGY | Facility: CLINIC | Age: 66
End: 2021-03-29

## 2021-03-30 ENCOUNTER — APPOINTMENT (OUTPATIENT)
Dept: DERMATOLOGY | Facility: CLINIC | Age: 66
End: 2021-03-30
Payer: MEDICARE

## 2021-03-30 DIAGNOSIS — L82.1 OTHER SEBORRHEIC KERATOSIS: ICD-10-CM

## 2021-03-30 PROCEDURE — 17110 DESTRUCTION B9 LES UP TO 14: CPT

## 2021-03-30 PROCEDURE — 99203 OFFICE O/P NEW LOW 30 MIN: CPT | Mod: 25

## 2021-03-30 RX ORDER — ASPIRIN 81 MG
81 TABLET, DELAYED RELEASE (ENTERIC COATED) ORAL
Refills: 0 | Status: DISCONTINUED | COMMUNITY
End: 2021-03-30

## 2021-03-30 NOTE — HISTORY OF PRESENT ILLNESS
[de-identified] : Pt. c/o warts on R thumb; hx warts in past tx with cryo\par also:  spots on scalp, R leg

## 2021-03-30 NOTE — PHYSICAL EXAM
[FreeTextEntry3] : Scaling waxy stuck on papule; R posterior lower leg;  similar, flat lesions on scalp\par \par keratotic papule with black dots on surface; R thumb tip, 2 on dorsum near IP and periungual;

## 2021-03-30 NOTE — ASSESSMENT
[FreeTextEntry1] : LN2 to 3 inflamed warts; \par Therapeutic options and their risks and benefits; along with multiple diagnostic possibilities were discussed at length; risks and benefits of further study were discussed;\par \par f/u 4-6 wks to recheck, re-tx if needed\par \par SKs;  benign;  no tx needed;

## 2021-04-28 ENCOUNTER — APPOINTMENT (OUTPATIENT)
Dept: DERMATOLOGY | Facility: CLINIC | Age: 66
End: 2021-04-28
Payer: MEDICARE

## 2021-04-28 DIAGNOSIS — L53.9 ERYTHEMATOUS CONDITION, UNSPECIFIED: ICD-10-CM

## 2021-04-28 PROCEDURE — 17110 DESTRUCTION B9 LES UP TO 14: CPT

## 2021-04-28 NOTE — HISTORY OF PRESENT ILLNESS
[de-identified] : Pt. c/o warts on R thumb; hx warts in past tx with cryo\par responded well to cryo 1 mo ago

## 2021-04-28 NOTE — ASSESSMENT
[FreeTextEntry1] : LN2 to 3 inflamed warts; \par Therapeutic options and their risks and benefits; along with multiple diagnostic possibilities were discussed at length; risks and benefits of further study were discussed;\par \par responded well;  smaller;  recheck 6 weeks

## 2021-04-28 NOTE — PHYSICAL EXAM
[FreeTextEntry3] : \par keratotic papule with black dots on surface; 1 R thumb tip, 2 on dorsum near IP and periungual;

## 2021-06-09 ENCOUNTER — APPOINTMENT (OUTPATIENT)
Dept: DERMATOLOGY | Facility: CLINIC | Age: 66
End: 2021-06-09
Payer: MEDICARE

## 2021-06-09 PROCEDURE — 17110 DESTRUCTION B9 LES UP TO 14: CPT

## 2021-06-09 RX ORDER — HYALURONATE SODIUM 10 MG/ML
SYRINGE (ML) INTRAARTICULAR
Refills: 0 | Status: COMPLETED | COMMUNITY
End: 2021-06-09

## 2021-06-09 RX ORDER — CLOBETASOL PROPIONATE 0.5 MG/G
0.05 OINTMENT TOPICAL
Qty: 1 | Refills: 2 | Status: COMPLETED | COMMUNITY
Start: 2017-09-06 | End: 2021-06-09

## 2021-06-09 NOTE — ASSESSMENT
[FreeTextEntry1] : LN2 to 1 residual wart; pared;\par Over the counter salicylic acid preparation info was given. \par \par Therapeutic options and their risks and benefits; along with multiple diagnostic possibilities were discussed at length; risks and benefits of further study were discussed;\par \par responded well;  smaller;  recheck 6 weeks

## 2021-06-09 NOTE — PHYSICAL EXAM
[FreeTextEntry3] : \par keratotic papule with black dots on surface; 1 R thumb tip, no residual near IP and periungual;

## 2021-12-15 ENCOUNTER — APPOINTMENT (OUTPATIENT)
Dept: DERMATOLOGY | Facility: CLINIC | Age: 66
End: 2021-12-15

## 2022-05-30 ENCOUNTER — EMERGENCY (EMERGENCY)
Facility: HOSPITAL | Age: 67
LOS: 0 days | Discharge: HOME | End: 2022-05-30
Attending: EMERGENCY MEDICINE
Payer: MEDICARE

## 2022-05-30 VITALS
RESPIRATION RATE: 18 BRPM | HEART RATE: 93 BPM | DIASTOLIC BLOOD PRESSURE: 95 MMHG | HEIGHT: 68 IN | SYSTOLIC BLOOD PRESSURE: 139 MMHG | TEMPERATURE: 100 F | OXYGEN SATURATION: 98 % | WEIGHT: 179.9 LBS

## 2022-05-30 DIAGNOSIS — I10 ESSENTIAL (PRIMARY) HYPERTENSION: ICD-10-CM

## 2022-05-30 DIAGNOSIS — H26.9 UNSPECIFIED CATARACT: Chronic | ICD-10-CM

## 2022-05-30 DIAGNOSIS — J06.9 ACUTE UPPER RESPIRATORY INFECTION, UNSPECIFIED: ICD-10-CM

## 2022-05-30 DIAGNOSIS — I49.1 ATRIAL PREMATURE DEPOLARIZATION: ICD-10-CM

## 2022-05-30 DIAGNOSIS — R05.1 ACUTE COUGH: ICD-10-CM

## 2022-05-30 DIAGNOSIS — R50.9 FEVER, UNSPECIFIED: ICD-10-CM

## 2022-05-30 DIAGNOSIS — I48.91 UNSPECIFIED ATRIAL FIBRILLATION: ICD-10-CM

## 2022-05-30 DIAGNOSIS — Z98.890 OTHER SPECIFIED POSTPROCEDURAL STATES: ICD-10-CM

## 2022-05-30 DIAGNOSIS — H26.9 UNSPECIFIED CATARACT: ICD-10-CM

## 2022-05-30 DIAGNOSIS — Z20.822 CONTACT WITH AND (SUSPECTED) EXPOSURE TO COVID-19: ICD-10-CM

## 2022-05-30 DIAGNOSIS — Z98.890 OTHER SPECIFIED POSTPROCEDURAL STATES: Chronic | ICD-10-CM

## 2022-05-30 DIAGNOSIS — R09.81 NASAL CONGESTION: ICD-10-CM

## 2022-05-30 LAB
FLUAV H1 2009 PAND RNA SPEC QL NAA+PROBE: DETECTED
RAPID RVP RESULT: DETECTED
SARS-COV-2 RNA SPEC QL NAA+PROBE: SIGNIFICANT CHANGE UP

## 2022-05-30 PROCEDURE — 99283 EMERGENCY DEPT VISIT LOW MDM: CPT

## 2022-05-30 PROCEDURE — 99283 EMERGENCY DEPT VISIT LOW MDM: CPT | Mod: FS,CS

## 2022-05-30 PROCEDURE — 0225U NFCT DS DNA&RNA 21 SARSCOV2: CPT

## 2022-05-30 NOTE — ED STATDOCS - NSICDXPASTMEDICALHX_GEN_ALL_CORE_FT
PAST MEDICAL HISTORY:  Afib     Chest pain     HTN (hypertension)     PAC (premature atrial contraction)     Palpitations

## 2022-05-30 NOTE — ED STATDOCS - ATTENDING APP SHARED VISIT CONTRIBUTION OF CARE
I,Misael Wellington MD,  performed the initial face to face bedside interview with this patient regarding history of present illness, review of symptoms and relevant past medical, social and family history.  I completed an independent physical examination.  I was the initial provider who evaluated this patient. I have signed out the follow up of any pending tests (i.e. labs, radiological studies) to the ACP.  I have communicated the patient’s plan of care and disposition with the ACP.  The history, relevant review of systems, past medical and surgical history, medical decision making, and physical examination was documented by the scribe in my presence and I attest to the accuracy of the documentation.

## 2022-05-30 NOTE — ED STATDOCS - NS ED ATTENDING STATEMENT MOD
This was a shared visit with the ZACHARY. I reviewed and verified the documentation and independently performed the documented:

## 2022-05-30 NOTE — ED STATDOCS - OBJECTIVE STATEMENT
66 y/o male with a PMHx of Afib, chest pain, HTN, PAC, palpitations presents to the ED c/o nasal congestion, subjective fevers and cough x3 days. Denies CP, SOB, abd pain. Vaccinated for COVID. Pt did not get flu vaccine this year. No known sick contacts. No other complaints at this time.

## 2022-05-30 NOTE — ED STATDOCS - PROGRESS NOTE DETAILS
patient seen and evaluated, well appearing with viral symptoms, will check covid/flu.  reviewed symptom management and return precautions -Cindy Villegas PA-C

## 2022-10-18 ENCOUNTER — APPOINTMENT (OUTPATIENT)
Dept: HEPATOLOGY | Facility: CLINIC | Age: 67
End: 2022-10-18

## 2022-12-24 ENCOUNTER — EMERGENCY (EMERGENCY)
Facility: HOSPITAL | Age: 67
LOS: 0 days | Discharge: HOME | End: 2022-12-24
Attending: STUDENT IN AN ORGANIZED HEALTH CARE EDUCATION/TRAINING PROGRAM
Payer: MEDICARE

## 2022-12-24 VITALS — WEIGHT: 175.05 LBS

## 2022-12-24 VITALS
DIASTOLIC BLOOD PRESSURE: 92 MMHG | SYSTOLIC BLOOD PRESSURE: 141 MMHG | RESPIRATION RATE: 18 BRPM | OXYGEN SATURATION: 100 % | HEART RATE: 85 BPM | TEMPERATURE: 98 F

## 2022-12-24 DIAGNOSIS — I48.91 UNSPECIFIED ATRIAL FIBRILLATION: ICD-10-CM

## 2022-12-24 DIAGNOSIS — R79.89 OTHER SPECIFIED ABNORMAL FINDINGS OF BLOOD CHEMISTRY: ICD-10-CM

## 2022-12-24 DIAGNOSIS — Z98.890 OTHER SPECIFIED POSTPROCEDURAL STATES: Chronic | ICD-10-CM

## 2022-12-24 DIAGNOSIS — I49.1 ATRIAL PREMATURE DEPOLARIZATION: ICD-10-CM

## 2022-12-24 DIAGNOSIS — R06.02 SHORTNESS OF BREATH: ICD-10-CM

## 2022-12-24 DIAGNOSIS — Z86.16 PERSONAL HISTORY OF COVID-19: ICD-10-CM

## 2022-12-24 DIAGNOSIS — H26.9 UNSPECIFIED CATARACT: Chronic | ICD-10-CM

## 2022-12-24 DIAGNOSIS — J98.01 ACUTE BRONCHOSPASM: ICD-10-CM

## 2022-12-24 DIAGNOSIS — B34.9 VIRAL INFECTION, UNSPECIFIED: ICD-10-CM

## 2022-12-24 DIAGNOSIS — I10 ESSENTIAL (PRIMARY) HYPERTENSION: ICD-10-CM

## 2022-12-24 LAB
ALBUMIN SERPL ELPH-MCNC: 3.5 G/DL — SIGNIFICANT CHANGE UP (ref 3.3–5)
ALP SERPL-CCNC: 132 U/L — HIGH (ref 40–120)
ALT FLD-CCNC: 242 U/L — HIGH (ref 12–78)
ANION GAP SERPL CALC-SCNC: 3 MMOL/L — LOW (ref 5–17)
ANISOCYTOSIS BLD QL: SLIGHT — SIGNIFICANT CHANGE UP
APTT BLD: 32.7 SEC — SIGNIFICANT CHANGE UP (ref 27.5–35.5)
AST SERPL-CCNC: 183 U/L — HIGH (ref 15–37)
BASOPHILS # BLD AUTO: 0.06 K/UL — SIGNIFICANT CHANGE UP (ref 0–0.2)
BASOPHILS NFR BLD AUTO: 0.7 % — SIGNIFICANT CHANGE UP (ref 0–2)
BILIRUB SERPL-MCNC: 1.8 MG/DL — HIGH (ref 0.2–1.2)
BUN SERPL-MCNC: 13 MG/DL — SIGNIFICANT CHANGE UP (ref 7–23)
CALCIUM SERPL-MCNC: 8.7 MG/DL — SIGNIFICANT CHANGE UP (ref 8.5–10.1)
CHLORIDE SERPL-SCNC: 110 MMOL/L — HIGH (ref 96–108)
CO2 SERPL-SCNC: 29 MMOL/L — SIGNIFICANT CHANGE UP (ref 22–31)
CREAT SERPL-MCNC: 0.76 MG/DL — SIGNIFICANT CHANGE UP (ref 0.5–1.3)
D DIMER BLD IA.RAPID-MCNC: 1498 NG/ML DDU — HIGH
EGFR: 99 ML/MIN/1.73M2 — SIGNIFICANT CHANGE UP
ELLIPTOCYTES BLD QL SMEAR: SLIGHT — SIGNIFICANT CHANGE UP
EOSINOPHIL # BLD AUTO: 0.34 K/UL — SIGNIFICANT CHANGE UP (ref 0–0.5)
EOSINOPHIL NFR BLD AUTO: 3.7 % — SIGNIFICANT CHANGE UP (ref 0–6)
GLUCOSE SERPL-MCNC: 120 MG/DL — HIGH (ref 70–99)
HCT VFR BLD CALC: 34.7 % — LOW (ref 39–50)
HGB BLD-MCNC: 11 G/DL — LOW (ref 13–17)
IMM GRANULOCYTES NFR BLD AUTO: 0.3 % — SIGNIFICANT CHANGE UP (ref 0–0.9)
INR BLD: 1.14 RATIO — SIGNIFICANT CHANGE UP (ref 0.88–1.16)
LYMPHOCYTES # BLD AUTO: 1.83 K/UL — SIGNIFICANT CHANGE UP (ref 1–3.3)
LYMPHOCYTES # BLD AUTO: 19.8 % — SIGNIFICANT CHANGE UP (ref 13–44)
MAGNESIUM SERPL-MCNC: 2 MG/DL — SIGNIFICANT CHANGE UP (ref 1.6–2.6)
MANUAL SMEAR VERIFICATION: SIGNIFICANT CHANGE UP
MCHC RBC-ENTMCNC: 19.8 PG — LOW (ref 27–34)
MCHC RBC-ENTMCNC: 31.7 GM/DL — LOW (ref 32–36)
MCV RBC AUTO: 62.5 FL — LOW (ref 80–100)
MICROCYTES BLD QL: SIGNIFICANT CHANGE UP
MONOCYTES # BLD AUTO: 0.98 K/UL — HIGH (ref 0–0.9)
MONOCYTES NFR BLD AUTO: 10.6 % — SIGNIFICANT CHANGE UP (ref 2–14)
NEUTROPHILS # BLD AUTO: 5.99 K/UL — SIGNIFICANT CHANGE UP (ref 1.8–7.4)
NEUTROPHILS NFR BLD AUTO: 64.9 % — SIGNIFICANT CHANGE UP (ref 43–77)
NT-PROBNP SERPL-SCNC: 241 PG/ML — HIGH (ref 0–125)
OVALOCYTES BLD QL SMEAR: SLIGHT — SIGNIFICANT CHANGE UP
PLAT MORPH BLD: NORMAL — SIGNIFICANT CHANGE UP
PLATELET # BLD AUTO: 180 K/UL — SIGNIFICANT CHANGE UP (ref 150–400)
POIKILOCYTOSIS BLD QL AUTO: SIGNIFICANT CHANGE UP
POTASSIUM SERPL-MCNC: 4 MMOL/L — SIGNIFICANT CHANGE UP (ref 3.5–5.3)
POTASSIUM SERPL-SCNC: 4 MMOL/L — SIGNIFICANT CHANGE UP (ref 3.5–5.3)
PROT SERPL-MCNC: 7.6 GM/DL — SIGNIFICANT CHANGE UP (ref 6–8.3)
PROTHROM AB SERPL-ACNC: 13.2 SEC — SIGNIFICANT CHANGE UP (ref 10.5–13.4)
RBC # BLD: 5.55 M/UL — SIGNIFICANT CHANGE UP (ref 4.2–5.8)
RBC # FLD: 15.7 % — HIGH (ref 10.3–14.5)
RBC BLD AUTO: ABNORMAL
SCHISTOCYTES BLD QL AUTO: SLIGHT — SIGNIFICANT CHANGE UP
SODIUM SERPL-SCNC: 142 MMOL/L — SIGNIFICANT CHANGE UP (ref 135–145)
TARGETS BLD QL SMEAR: SLIGHT — SIGNIFICANT CHANGE UP
TROPONIN I, HIGH SENSITIVITY RESULT: 6.02 NG/L — SIGNIFICANT CHANGE UP
WBC # BLD: 9.23 K/UL — SIGNIFICANT CHANGE UP (ref 3.8–10.5)
WBC # FLD AUTO: 9.23 K/UL — SIGNIFICANT CHANGE UP (ref 3.8–10.5)

## 2022-12-24 PROCEDURE — 83735 ASSAY OF MAGNESIUM: CPT

## 2022-12-24 PROCEDURE — 99285 EMERGENCY DEPT VISIT HI MDM: CPT | Mod: 25

## 2022-12-24 PROCEDURE — 71045 X-RAY EXAM CHEST 1 VIEW: CPT

## 2022-12-24 PROCEDURE — 85730 THROMBOPLASTIN TIME PARTIAL: CPT

## 2022-12-24 PROCEDURE — 84484 ASSAY OF TROPONIN QUANT: CPT

## 2022-12-24 PROCEDURE — 94640 AIRWAY INHALATION TREATMENT: CPT

## 2022-12-24 PROCEDURE — 71275 CT ANGIOGRAPHY CHEST: CPT | Mod: MA

## 2022-12-24 PROCEDURE — 93010 ELECTROCARDIOGRAM REPORT: CPT

## 2022-12-24 PROCEDURE — 36415 COLL VENOUS BLD VENIPUNCTURE: CPT

## 2022-12-24 PROCEDURE — 85379 FIBRIN DEGRADATION QUANT: CPT

## 2022-12-24 PROCEDURE — 80053 COMPREHEN METABOLIC PANEL: CPT

## 2022-12-24 PROCEDURE — 99285 EMERGENCY DEPT VISIT HI MDM: CPT | Mod: FS

## 2022-12-24 PROCEDURE — 83880 ASSAY OF NATRIURETIC PEPTIDE: CPT

## 2022-12-24 PROCEDURE — 85610 PROTHROMBIN TIME: CPT

## 2022-12-24 PROCEDURE — 71275 CT ANGIOGRAPHY CHEST: CPT | Mod: 26,MA

## 2022-12-24 PROCEDURE — 85025 COMPLETE CBC W/AUTO DIFF WBC: CPT

## 2022-12-24 PROCEDURE — 71045 X-RAY EXAM CHEST 1 VIEW: CPT | Mod: 26

## 2022-12-24 PROCEDURE — 93005 ELECTROCARDIOGRAM TRACING: CPT

## 2022-12-24 RX ORDER — SODIUM CHLORIDE 9 MG/ML
1000 INJECTION INTRAMUSCULAR; INTRAVENOUS; SUBCUTANEOUS ONCE
Refills: 0 | Status: COMPLETED | OUTPATIENT
Start: 2022-12-24 | End: 2022-12-24

## 2022-12-24 RX ORDER — IPRATROPIUM/ALBUTEROL SULFATE 18-103MCG
3 AEROSOL WITH ADAPTER (GRAM) INHALATION ONCE
Refills: 0 | Status: COMPLETED | OUTPATIENT
Start: 2022-12-24 | End: 2022-12-24

## 2022-12-24 RX ADMIN — Medication 3 MILLILITER(S): at 14:29

## 2022-12-24 RX ADMIN — SODIUM CHLORIDE 1000 MILLILITER(S): 9 INJECTION INTRAMUSCULAR; INTRAVENOUS; SUBCUTANEOUS at 12:55

## 2022-12-24 NOTE — ED STATDOCS - CARE PROVIDER_API CALL
Ruben Coughlin)  Family Medicine  [address] [place] [address]  Phone: [phone]  Fax: [phone]  Follow Up Time: 1-3 Days

## 2022-12-24 NOTE — ED STATDOCS - ATTENDING APP SHARED VISIT CONTRIBUTION OF CARE
I, Marisel Nicole DO,  performed the initial face to face bedside interview with this patient regarding history of present illness, review of symptoms and relevant past medical, social and family history.  I completed an independent physical examination.  I was the initial provider who evaluated this patient.   I personally saw the patient and performed a substantive portion of the visit including all aspects of the medical decision making.  I have signed out the follow up of any pending tests (i.e. labs, radiological studies) to the ACP.  I have communicated the patient’s plan of care and disposition with the ACP.  The history, relevant review of systems, past medical and surgical history, medical decision making, and physical examination was documented by the scribe in my presence and I attest to the accuracy of the documentation.

## 2022-12-24 NOTE — ED STATDOCS - OBJECTIVE STATEMENT
68 y/o male with PMHx of palpitations, HTN, PAC, Afib presents to the ED c/o SOB. Patient had COVID on 12/9 and feels that he never got better. He woke up this morning feeling like he couldn't breathe. He felt a little better after swallowing some phlegm. Patient is a nonsmoker.

## 2022-12-24 NOTE — ED STATDOCS - CARE PLAN
Principal Discharge DX:	Bronchospasm  Secondary Diagnosis:	Viral syndrome  Secondary Diagnosis:	SOB (shortness of breath)   1

## 2022-12-24 NOTE — ED STATDOCS - PATIENT PORTAL LINK FT
You can access the FollowMyHealth Patient Portal offered by [facility] by registering at the following website: [email or web address]. By joining [employer]’s FollowMyHealth portal, you will also be able to view your health information using other applications (apps) compatible with our system.

## 2022-12-24 NOTE — ED STATDOCS - PROGRESS NOTE DETAILS
Pt. is a 67 year old male Hx f palpitations, HTN, PAC, Afib presents to the ED c/o SOB.  Pt. diagnosed with COVID 12/9.  Initially he was improving with symptoms however this morning awoke SOB.  Pt. swallowed some phlegm from the cough and feels slightly better.  Neg. N/V.  PMD:  Ced Farther history obtained.  Pt with history of Hepatitis C?  reviewed old labs and LFTs elevated as well as today.  Denies alcohol use.  Pt. also had cardiac ablation for his Afib.  D DImer elevated.  CTA ordered.   Christina David PA-C Pt. is a 67 year old male Hx f palpitations, HTN, PAC, Afib presents to the ED c/o SOB.  Pt. diagnosed with COVID 12/9.  Initially he was improving with symptoms however this morning awoke SOB.   Pt had t Pt. swallowed some phlegm from the cough and feels slightly better.   Pt. was prescribed Paxlovid by his PMD.  PT. is vaccinated for COVID and boosted.  Neg. N/V.  PMD:  Ced I discussed labs/CT with Dr. Coughlin, PMD.  Pt. with chronic elevated LFTs due to Hep C, CTA negative for PE.  Patient ambulated and oxygen saturation remained at 98%.  Dr. Coughlin in hospital and will be in to see his patient.  Christina David PA-C Pt. with wheezing as per Dr. Coughlin.  Pt. provided one duoneb.  Rx sent for Medrol pack, albuterol inhaler and Zithromax by Dr. Coughlin.  Pt. aware of return instructions and follow with Dr. Coughlin outpatient.  Christina David PA-C

## 2022-12-24 NOTE — ED ADULT NURSE NOTE - NS ED NURSE RECORD ANOTHER VITAL SIGN
[No] : No [] : No [Patient reported colonoscopy was normal] : Patient reported colonoscopy was normal [Change in mental status noted] : No change in mental status noted [Language] : denies difficulty with language [None] : None [Fully functional (bathing, dressing, toileting, transferring, walking, feeding)] : Fully functional (bathing, dressing, toileting, transferring, walking, feeding) [Fully functional (using the telephone, shopping, preparing meals, housekeeping, doing laundry, using] : Fully functional and needs no help or supervision to perform IADLs (using the telephone, shopping, preparing meals, housekeeping, doing laundry, using transportation, managing medications and managing finances) [ColonoscopyDate] : 05/17 Yes

## 2022-12-24 NOTE — ED ADULT TRIAGE NOTE - STATUS:
Patient slid down 12 stairs on left arm in ablility to move left arm tender to touch. unable to view arm. good pulse and cap refill. no head injury no loc no other complaints .
Applied

## 2022-12-24 NOTE — ED ADULT NURSE NOTE - OBJECTIVE STATEMENT
Pt present to ED w c/o SOB that started today. spo2 98% RA. Tested COVID positive 12/9. Pt has a productive cough with yellow sputum, generalized weakness, Pleuritic pain from cough. Pt denies fever, n/v/d.

## 2022-12-24 NOTE — ED ADULT NURSE REASSESSMENT NOTE - NS ED NURSE REASSESS COMMENT FT1
Pt offered a meal as per PA request. Pt states "I don't want to eat". Juice or water offered pt declined. Pt safety and comfort maintained.  Pass O2 ambulation.

## 2022-12-24 NOTE — ED STATDOCS - NSFOLLOWUPINSTRUCTIONS_ED_ALL_ED_FT
Bronchospasm, Adult    Outline of a person's upper body showing the lungs, with close-ups of two airways, one normal and one tightened.   Bronchospasm is a tightening of the smooth muscle that wraps around the small airways in the lungs. When the muscle tightens, the small airways narrow. Narrowed airways limit the air you breathe in or out of your lungs. Inflammation (swelling) and more mucus (sputum) than usual can further irritate the airways. This can make it very hard to breathe. Bronchospasm can happen suddenly or over a period of time.      What are the causes?    Common causes of this condition include:  •An infection, such as a cold or sinus drainage.      •Exercise.      •Strong odors from aerosol sprays, and fumes from perfume, candles, and household .      •Cold air.      •Stress or strong emotions such as crying or laughing.        What increases the risk?    The following factors may make you more likely to develop this condition:  •Having asthma.      •Smoking or being around someone who smokes (secondhand smoke).      •Seasonal allergies, such as pollen or mold.      •Allergic reaction (anaphylaxis) to food, medicine, or insect bites or stings.        What are the signs or symptoms?    Symptoms of this condition include:  •Making a high-pitched whistling sound when you breathe, most often when you breathe out (wheezing).      •Coughing.      •Chest tightness.      •Shortness of breath.      •Decreased ability to exercise.      •Noisy breathing or a high-pitched cough.        How is this diagnosed?    This condition may be diagnosed based on your medical history and a physical exam. Your health care provider may also perform tests, including:  •A chest X-ray.      •Lung function tests.        How is this treated?  Two respiratory inhalers.   This condition may be treated by:  •Using inhaled medicines. These open up (relax) the airways and help you breathe. They can be taken with a metered dose inhaler or a nebulizer device.      •Taking corticosteroid medicines. These may be given to reduce inflammation and swelling.      •Removing the irritant or trigger that started the bronchospasm.        Follow these instructions at home:    Medicines     •Take over-the-counter and prescription medicines only as told by your health care provider.      •If you need to use an inhaler or nebulizer to take your medicine, ask your health care provider how to use it correctly.      •You may be given a spacer to use with your inhaler. This makes it easier to get the medicine from the inhaler into your lungs.      Lifestyle     • Do not use any products that contain nicotine or tobacco. These products include cigarettes, chewing tobacco, and vaping devices, such as e-cigarettes. If you need help quitting, ask your health care provider.      •Keep track of things that trigger your bronchospasm. Avoid these if possible.      •When pollen, air pollution, or humidity levels are bad, keep windows closed and use an air conditioner or go to places that have air conditioning.      •Find ways to manage stress and your emotions, such as mindfulness, relaxation, or breathing exercises.      Activity     Some people have bronchospasm when they exercise. This is called exercise-induced bronchoconstriction (EIB). If you have this problem, talk with your health care provider about how to manage EIB. Some tips include:  •Using your fast-acting inhaler before exercise.      •Exercising indoors if it is very cold or humid, or if the pollen and mold counts are high.      •Warming up and cool down before and after exercise.      •Stopping exercising right away if your symptoms start or get worse.      General instructions    •If you have asthma, make sure you have an asthma action plan.      •Stay up to date on your immunizations.      •Keep all follow-up visits. This is important.        Get help right away if:    •You have trouble breathing.      •Your wheezing and coughing do not get better after taking your medicine.      •You have chest pain.      •You have trouble speaking more than one-word sentences.      These symptoms may be an emergency. Get help right away. Call 911.   • Do not wait to see if the symptoms will go away.        • Do not drive yourself to the hospital.         Summary    •Bronchospasm is a tightening of the smooth muscle that wraps around the small airways in the lungs.      •Some people have bronchospasm when they exercise. This is called exercise-induced bronchoconstriction (EIB). If you have this problem, talk with your health care provider about how to manage EIB.      • Do not use any products that contain nicotine or tobacco. These products include cigarettes, chewing tobacco, and vaping devices, such as e-cigarettes. If you need help quitting, ask your health care provider.      •Get help right away if your wheezing and coughing do not get better after taking your medicine.      This information is not intended to replace advice given to you by your health care provider. Make sure you discuss any questions you have with your health care provider.    Shortness of Breath    WHAT YOU NEED TO KNOW:    Shortness of breath is a feeling that you cannot get enough air when you breathe in. You may have this feeling only during activity, or all the time. Your symptoms can range from mild to severe. Shortness of breath may be a sign of a serious health condition that needs immediate care.    DISCHARGE INSTRUCTIONS:    Return to the emergency department if:     Your signs and symptoms are the same or worse within 24 hours of treatment.       The skin over your ribs or on your neck sinks in when you breathe.       You feel confused or dizzy.    Contact your healthcare provider if:     You have new or worsening symptoms.      You have questions or concerns about your condition or care.    Medicines:     Medicines may be used to treat the cause of your symptoms. You may need medicine to treat a bacterial infection or reduce anxiety. Other medicines may be used to open your airway, reduce swelling, or remove extra fluid. If you have a heart condition, you may need medicine to help your heart beat more strongly or regularly.      Take your medicine as directed. Contact your healthcare provider if you think your medicine is not helping or if you have side effects. Tell him or her if you are allergic to any medicine. Keep a list of the medicines, vitamins, and herbs you take. Include the amounts, and when and why you take them. Bring the list or the pill bottles to follow-up visits. Carry your medicine list with you in case of an emergency.    Manage shortness of breath:     Create an action plan. You and your healthcare provider can work together to create a plan for how to handle shortness of breath. The plan can include daily activities, treatment changes, and what to do if you have severe breathing problems.      Lean forward on your elbows when you sit. This helps your lungs expand and may make it easier to breathe.      Use pursed-lip breathing any time you feel short of breath. Breathe in through your nose and then slowly breathe out through your mouth with your lips slightly puckered. It should take you twice as long to breathe out as it did to breathe in.Breathe in Breathe out           Do not smoke. Nicotine and other chemicals in cigarettes and cigars can cause lung damage and make shortness of breath worse. Ask your healthcare provider for information if you currently smoke and need help to quit. E-cigarettes or smokeless tobacco still contain nicotine. Talk to your healthcare provider before you use these products.      Reach or maintain a healthy weight. Your healthcare provider can help you create a safe weight loss plan if you are overweight.      Exercise as directed. Exercise can help your lungs work more easily. Exercise can also help you lose weight if needed. Try to get at least 30 minutes of exercise most days of the week.      Viral Respiratory Infection      A respiratory infection is an illness that affects part of the respiratory system, such as the lungs, nose, or throat. A respiratory infection that is caused by a virus is called a viral respiratory infection.    Common types of viral respiratory infections include:  •A cold.      •The flu (influenza).      •A respiratory syncytial virus (RSV) infection.        What are the causes?    This condition is caused by a virus. The virus may spread through contact with droplets or direct contact with infected people or their mucus or secretions. The virus may spread from person to person (is contagious).      What are the signs or symptoms?    Symptoms of this condition include:  •A stuffy or runny nose.      •A sore throat or cough.      •Shortness of breath or difficulty breathing.      •Yellow or green mucus (sputum).      Other symptoms may include:  •A fever.      •Sweating or chills.      •Fatigue.      •Achy muscles.      •A headache.        How is this diagnosed?    This condition may be diagnosed based on:  •Your symptoms.      •A physical exam.      •Testing of secretions from the nose or throat.      •Chest X-ray.        How is this treated?    This condition may be treated with medicines, such as:  •Antiviral medicine. This may shorten the length of time a person has symptoms.      •Expectorants. These make it easier to cough up mucus.      •Decongestant nasal sprays.      •Acetaminophen or NSAIDs, such as ibuprofen, to relieve fever and pain.      Antibiotic medicines are not prescribed for viral infections.This is because antibiotics are designed to kill bacteria. They do not kill viruses.      Follow these instructions at home:    Managing pain and congestion     •Take over-the-counter and prescription medicines only as told by your health care provider.      •If you have a sore throat, gargle with a mixture of salt and water 3–4 times a day or as needed. To make salt water, completely dissolve ½–1 tsp (3–6 g) of salt in 1 cup (237 mL) of warm water.      •Use nose drops made from salt water to ease congestion and soften raw skin around your nose.    •Take 2 tsp (10 mL) of honey at bedtime to lessen coughing at night.  •Do not give honey to children who are younger than 1 year.        •Drink enough fluid to keep your urine pale yellow. This helps prevent dehydration and helps loosen up mucus.        General instructions   A sign telling the reader not to smoke.   •Rest as much as possible.      • Do not drink alcohol.      • Do not use any products that contain nicotine or tobacco. These products include cigarettes, chewing tobacco, and vaping devices, such as e-cigarettes. If you need help quitting, ask your health care provider.      •Keep all follow-up visits. This is important.        How is this prevented?      Washing hands with soap and water.       A person covering her mouth and nose with a cloth while sneezing.     •Get an annual flu shot. You may get the flu shot in late summer, fall, or winter. Ask your health care provider when you should get your flu shot.    •Avoid spreading your infection to other people. If you are sick:  •Wash your hands with soap and water often, especially after you cough or sneeze. Wash for at least 20 seconds. If soap and water are not available, use alcohol-based hand .      •Cover your mouth when you cough. Cover your nose and mouth when you sneeze.      •Do not share cups or eating utensils.      •Clean commonly used objects often. Clean commonly touched surfaces.      •Stay home from work or school as told by your health care provider.        •Avoid contact with people who are sick during cold and flu season. This is generally fall and winter.        Contact a health care provider if:    •Your symptoms last for 10 days or longer.      •Your symptoms get worse over time.      •You have severe sinus pain in your face or forehead.      •The glands in your jaw or neck become very swollen.      •You have shortness of breath.        Get help right away if you:    •Feel pain or pressure in your chest.      •Have trouble breathing.      •Faint or feel like you will faint.      •Have severe and persistent vomiting.      •Feel confused or disoriented.      These symptoms may represent a serious problem that is an emergency. Do not wait to see if the symptoms will go away. Get medical help right away. Call your local emergency services (911 in the [place] Do not drive yourself to the hospital.       Summary    •A respiratory infection is an illness that affects part of the respiratory system, such as the lungs, nose, or throat. A respiratory infection that is caused by a virus is called a viral respiratory infection.      •Common types of viral respiratory infections include a cold, influenza, and respiratory syncytial virus (RSV) infection.      •Symptoms of this condition include a stuffy or runny nose, cough, fatigue, achy muscles, sore throat, and fevers or chills.      •Antibiotic medicines are not prescribed for viral infections. This is because antibiotics are designed to kill bacteria. They are not effective against viruses.      This information is not intended to replace advice given to you by your health care provider. Make sure you discuss any questions you have with your health care provider.      Follow up with your healthcare provider or specialist as directed: Write down your questions so you remember to ask them during your visits.

## 2023-05-12 ENCOUNTER — EMERGENCY (EMERGENCY)
Facility: HOSPITAL | Age: 68
LOS: 0 days | Discharge: HOME | End: 2023-05-12
Attending: EMERGENCY MEDICINE
Payer: MEDICARE

## 2023-05-12 VITALS
DIASTOLIC BLOOD PRESSURE: 78 MMHG | SYSTOLIC BLOOD PRESSURE: 128 MMHG | HEART RATE: 80 BPM | OXYGEN SATURATION: 99 % | TEMPERATURE: 98 F | RESPIRATION RATE: 18 BRPM

## 2023-05-12 VITALS
OXYGEN SATURATION: 98 % | HEIGHT: 68 IN | WEIGHT: 175.05 LBS | SYSTOLIC BLOOD PRESSURE: 131 MMHG | TEMPERATURE: 98 F | DIASTOLIC BLOOD PRESSURE: 83 MMHG | HEART RATE: 83 BPM | RESPIRATION RATE: 18 BRPM

## 2023-05-12 DIAGNOSIS — R79.89 OTHER SPECIFIED ABNORMAL FINDINGS OF BLOOD CHEMISTRY: ICD-10-CM

## 2023-05-12 DIAGNOSIS — I10 ESSENTIAL (PRIMARY) HYPERTENSION: ICD-10-CM

## 2023-05-12 DIAGNOSIS — R29.700 NIHSS SCORE 0: ICD-10-CM

## 2023-05-12 DIAGNOSIS — R51.9 HEADACHE, UNSPECIFIED: ICD-10-CM

## 2023-05-12 DIAGNOSIS — I48.91 UNSPECIFIED ATRIAL FIBRILLATION: ICD-10-CM

## 2023-05-12 DIAGNOSIS — R53.83 OTHER FATIGUE: ICD-10-CM

## 2023-05-12 DIAGNOSIS — H26.9 UNSPECIFIED CATARACT: Chronic | ICD-10-CM

## 2023-05-12 DIAGNOSIS — H53.8 OTHER VISUAL DISTURBANCES: ICD-10-CM

## 2023-05-12 DIAGNOSIS — D64.9 ANEMIA, UNSPECIFIED: ICD-10-CM

## 2023-05-12 DIAGNOSIS — R21 RASH AND OTHER NONSPECIFIC SKIN ERUPTION: ICD-10-CM

## 2023-05-12 DIAGNOSIS — L29.9 PRURITUS, UNSPECIFIED: ICD-10-CM

## 2023-05-12 DIAGNOSIS — Z85.46 PERSONAL HISTORY OF MALIGNANT NEOPLASM OF PROSTATE: ICD-10-CM

## 2023-05-12 DIAGNOSIS — R39.89 OTHER SYMPTOMS AND SIGNS INVOLVING THE GENITOURINARY SYSTEM: ICD-10-CM

## 2023-05-12 DIAGNOSIS — H93.13 TINNITUS, BILATERAL: ICD-10-CM

## 2023-05-12 DIAGNOSIS — Z98.890 OTHER SPECIFIED POSTPROCEDURAL STATES: Chronic | ICD-10-CM

## 2023-05-12 LAB
ALBUMIN SERPL ELPH-MCNC: 4 G/DL — SIGNIFICANT CHANGE UP (ref 3.3–5)
ALP SERPL-CCNC: 131 U/L — HIGH (ref 40–120)
ALT FLD-CCNC: 49 U/L — SIGNIFICANT CHANGE UP (ref 12–78)
ANION GAP SERPL CALC-SCNC: 2 MMOL/L — LOW (ref 5–17)
APTT BLD: 30.8 SEC — SIGNIFICANT CHANGE UP (ref 27.5–35.5)
AST SERPL-CCNC: 56 U/L — HIGH (ref 15–37)
BASOPHILS # BLD AUTO: 0.09 K/UL — SIGNIFICANT CHANGE UP (ref 0–0.2)
BASOPHILS NFR BLD AUTO: 1 % — SIGNIFICANT CHANGE UP (ref 0–2)
BILIRUB SERPL-MCNC: 1.2 MG/DL — SIGNIFICANT CHANGE UP (ref 0.2–1.2)
BUN SERPL-MCNC: 18 MG/DL — SIGNIFICANT CHANGE UP (ref 7–23)
CALCIUM SERPL-MCNC: 8.9 MG/DL — SIGNIFICANT CHANGE UP (ref 8.5–10.1)
CHLORIDE SERPL-SCNC: 108 MMOL/L — SIGNIFICANT CHANGE UP (ref 96–108)
CO2 SERPL-SCNC: 28 MMOL/L — SIGNIFICANT CHANGE UP (ref 22–31)
CREAT SERPL-MCNC: 1.07 MG/DL — SIGNIFICANT CHANGE UP (ref 0.5–1.3)
EGFR: 76 ML/MIN/1.73M2 — SIGNIFICANT CHANGE UP
EOSINOPHIL # BLD AUTO: 0.7 K/UL — HIGH (ref 0–0.5)
EOSINOPHIL NFR BLD AUTO: 7.5 % — HIGH (ref 0–6)
GLUCOSE SERPL-MCNC: 118 MG/DL — HIGH (ref 70–99)
HCT VFR BLD CALC: 38.5 % — LOW (ref 39–50)
HGB BLD-MCNC: 12.5 G/DL — LOW (ref 13–17)
IMM GRANULOCYTES NFR BLD AUTO: 0.3 % — SIGNIFICANT CHANGE UP (ref 0–0.9)
INR BLD: 1.11 RATIO — SIGNIFICANT CHANGE UP (ref 0.88–1.16)
LYMPHOCYTES # BLD AUTO: 2.08 K/UL — SIGNIFICANT CHANGE UP (ref 1–3.3)
LYMPHOCYTES # BLD AUTO: 22.4 % — SIGNIFICANT CHANGE UP (ref 13–44)
MCHC RBC-ENTMCNC: 20.3 PG — LOW (ref 27–34)
MCHC RBC-ENTMCNC: 32.5 GM/DL — SIGNIFICANT CHANGE UP (ref 32–36)
MCV RBC AUTO: 62.4 FL — LOW (ref 80–100)
MONOCYTES # BLD AUTO: 0.73 K/UL — SIGNIFICANT CHANGE UP (ref 0–0.9)
MONOCYTES NFR BLD AUTO: 7.9 % — SIGNIFICANT CHANGE UP (ref 2–14)
NEUTROPHILS # BLD AUTO: 5.66 K/UL — SIGNIFICANT CHANGE UP (ref 1.8–7.4)
NEUTROPHILS NFR BLD AUTO: 60.9 % — SIGNIFICANT CHANGE UP (ref 43–77)
PLATELET # BLD AUTO: 184 K/UL — SIGNIFICANT CHANGE UP (ref 150–400)
POTASSIUM SERPL-MCNC: 4.9 MMOL/L — SIGNIFICANT CHANGE UP (ref 3.5–5.3)
POTASSIUM SERPL-SCNC: 4.9 MMOL/L — SIGNIFICANT CHANGE UP (ref 3.5–5.3)
PROT SERPL-MCNC: 8.5 GM/DL — HIGH (ref 6–8.3)
PROTHROM AB SERPL-ACNC: 12.9 SEC — SIGNIFICANT CHANGE UP (ref 10.5–13.4)
RBC # BLD: 6.17 M/UL — HIGH (ref 4.2–5.8)
RBC # FLD: 17.1 % — HIGH (ref 10.3–14.5)
SODIUM SERPL-SCNC: 138 MMOL/L — SIGNIFICANT CHANGE UP (ref 135–145)
TROPONIN I, HIGH SENSITIVITY RESULT: <3 NG/L — SIGNIFICANT CHANGE UP
WBC # BLD: 9.29 K/UL — SIGNIFICANT CHANGE UP (ref 3.8–10.5)
WBC # FLD AUTO: 9.29 K/UL — SIGNIFICANT CHANGE UP (ref 3.8–10.5)

## 2023-05-12 PROCEDURE — 99285 EMERGENCY DEPT VISIT HI MDM: CPT | Mod: FS

## 2023-05-12 PROCEDURE — 70496 CT ANGIOGRAPHY HEAD: CPT | Mod: MA

## 2023-05-12 PROCEDURE — 85025 COMPLETE CBC W/AUTO DIFF WBC: CPT

## 2023-05-12 PROCEDURE — 85610 PROTHROMBIN TIME: CPT

## 2023-05-12 PROCEDURE — 80053 COMPREHEN METABOLIC PANEL: CPT

## 2023-05-12 PROCEDURE — 71045 X-RAY EXAM CHEST 1 VIEW: CPT | Mod: 26

## 2023-05-12 PROCEDURE — 84484 ASSAY OF TROPONIN QUANT: CPT

## 2023-05-12 PROCEDURE — 86850 RBC ANTIBODY SCREEN: CPT

## 2023-05-12 PROCEDURE — 93005 ELECTROCARDIOGRAM TRACING: CPT

## 2023-05-12 PROCEDURE — 99285 EMERGENCY DEPT VISIT HI MDM: CPT | Mod: 25

## 2023-05-12 PROCEDURE — 93010 ELECTROCARDIOGRAM REPORT: CPT

## 2023-05-12 PROCEDURE — 85730 THROMBOPLASTIN TIME PARTIAL: CPT

## 2023-05-12 PROCEDURE — 70498 CT ANGIOGRAPHY NECK: CPT | Mod: MA

## 2023-05-12 PROCEDURE — 71045 X-RAY EXAM CHEST 1 VIEW: CPT

## 2023-05-12 PROCEDURE — 70498 CT ANGIOGRAPHY NECK: CPT | Mod: 26,MA

## 2023-05-12 PROCEDURE — 36415 COLL VENOUS BLD VENIPUNCTURE: CPT

## 2023-05-12 PROCEDURE — 70496 CT ANGIOGRAPHY HEAD: CPT | Mod: 26,MA

## 2023-05-12 PROCEDURE — 86901 BLOOD TYPING SEROLOGIC RH(D): CPT

## 2023-05-12 PROCEDURE — 86900 BLOOD TYPING SEROLOGIC ABO: CPT

## 2023-05-12 NOTE — ED STATDOCS - NS ED ROS FT
Constitutional: No fevers, chills, or sweats.  Cardiac: No chest pain, exertional dyspnea, orthopnea  Respiratory: No shortness of breath, no cough  GI: No abdominal pain, no N/V/D  Neuro: No neck pain/stiffness, no numbness +HA  Eyes: +b/l blurry vision  ENT: +b/l tinnitus   All other systems reviewed and are negative unless otherwise stated in the HPI.

## 2023-05-12 NOTE — ED STATDOCS - PATIENT PORTAL LINK FT
You can access the FollowMyHealth Patient Portal offered by [facility] by registering at the following website: [email or web address]. By joining [facility]’s FollowMyHealth portal, you will also be able to view your health information using other applications (apps) compatible with our system.

## 2023-05-12 NOTE — ED STATDOCS - NS_ ATTENDINGSCRIBEDETAILS _ED_A_ED_FT
I, Arley Tidwell MD,  performed the initial face to face bedside interview with this patient regarding history of present illness, review of symptoms and relevant past medical, social and family history.  I completed an independent physical examination.  I was the initial provider who evaluated this patient. I have signed out the follow up of any pending tests (i.e. labs, radiological studies) to the ZACHARY.  I have communicated the patient’s plan of care and disposition with the ZACHARY.  The history, relevant review of systems, past medical and surgical history, medical decision making, and physical examination was documented by the scribe in my presence and I attest to the accuracy of the documentation.

## 2023-05-12 NOTE — ED ADULT TRIAGE NOTE - CHIEF COMPLAINT QUOTE
Pt presents to the ED c/o pounding headache, blurred vision, and weakness for months. Pt had an MRA/MRI last week revealing a blockage and was intrusted to come to the ED by Dr. Coughlin immediately. PMH of HTN. Pt presents to the ED c/o pounding headache, blurred vision, and weakness for months. Pt had an MRA/MRI last week revealing a blockage and was intrusted to come to the ED by Dr. Coughlin immediately. Pt also c/o rash to the back of his legs x5 days. PMH of HTN.

## 2023-05-12 NOTE — ED STATDOCS - ATTENDING APP SHARED VISIT CONTRIBUTION OF CARE
I, Arley Tidwell MD, personally saw the patient with ZACHARY.  I have personally performed a face to face diagnostic evaluation on this patient.  I have reviewed the ZACHARY note and agree with the history, exam, and plan of care, except as noted.

## 2023-05-12 NOTE — ED STATDOCS - CLINICAL SUMMARY MEDICAL DECISION MAKING FREE TEXT BOX
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 No neuro deficits, here NIHSS = 0, Sx have been chronic no indication for code stroke. Plan for CVA workup w/ CTA head/neck, cardiac monitoring, and reassess.

## 2023-05-12 NOTE — ED STATDOCS - OBJECTIVE STATEMENT
69 y/o male w/ a PMHx of HTN, Afib s/p ablation, PAC, prostate CA, Hep C, and cataracts presents to the ED sent in by Dr. Coughlin regarding pounding HA and b/l tinnitus x few months. Pt reports he had MRIs done x1 week ago, was called today w/ results, states he has some sort of blockage in the brain. Pt endorses worsening b/l blurry vision. Denies n/v. Pt also c/o itchy rashes to b/l LEs. Pt not on blood thinners. Pt able to ambulate.

## 2023-05-12 NOTE — ED ADULT NURSE NOTE - NSFALLUNIVINTERV_ED_ALL_ED
Bed/Stretcher in lowest position, wheels locked, appropriate side rails in place/Call bell, personal items and telephone in reach/Instruct patient to call for assistance before getting out of bed/chair/stretcher/Non-slip footwear applied when patient is off stretcher/[place] to call system/Physically safe environment - no spills, clutter or unnecessary equipment/Purposeful proactive rounding/Room/bathroom lighting operational, light cord in reach

## 2023-05-12 NOTE — ED STATDOCS - CARE PLAN
1 Principal Discharge DX:	Abnormal findings on imaging test  Secondary Diagnosis:	Headache  Secondary Diagnosis:	Fatigue

## 2023-05-12 NOTE — ED ADULT NURSE NOTE - OBJECTIVE STATEMENT
Pt presents to the ED c/o pounding headache, blurred vision, and weakness for months. Pt had an MRA/MRI last week revealing a blockage and was intrusted to come to the ED by Dr. Coughlin immediately. Pt also c/o rash to the back of his legs x5 days. PMH of HTN.

## 2023-05-12 NOTE — ED STATDOCS - PHYSICAL EXAMINATION
General: AAOx3, NAD  HEENT: NCAT  Cardiac: Normal rate and rhythm, no murmurs, normal peripheral perfusion  Respiratory: Normal rate and effort. CTAB  GI: Soft, nondistended, nontender  Neuro: No focal deficits. CARTY equally x4, sensation to light touch intact throughout  MSK: FROMx4, no focal bony tenderness, no peripheral edema  Skin: No rash

## 2023-05-25 ENCOUNTER — APPOINTMENT (OUTPATIENT)
Dept: ORTHOPEDIC SURGERY | Facility: CLINIC | Age: 68
End: 2023-05-25
Payer: MEDICARE

## 2023-05-25 VITALS
HEIGHT: 68 IN | SYSTOLIC BLOOD PRESSURE: 131 MMHG | BODY MASS INDEX: 26.52 KG/M2 | WEIGHT: 175 LBS | HEART RATE: 77 BPM | DIASTOLIC BLOOD PRESSURE: 86 MMHG

## 2023-05-25 PROCEDURE — 99213 OFFICE O/P EST LOW 20 MIN: CPT

## 2023-05-25 PROCEDURE — 73030 X-RAY EXAM OF SHOULDER: CPT | Mod: LT

## 2023-06-05 NOTE — ADDENDUM
[FreeTextEntry1] : This note was written by Ros Watson on 06/05/2023 acting as a scribe for ALESHIA LEYVA III, MD

## 2023-06-05 NOTE — CONSULT LETTER
[Dear  ___] : Dear  [unfilled], [FreeTextEntry1] : \par I had the pleasure of evaluating your patient, Dwayne Peters.\par \par Thank you very much for allowing me to participate in the care of this patient.\par Pleas see my note below.\par \par If you have any questions, please do not hesitate to contact me.\par \par Sincerely,\par \par \par Vasiliy Matias III, MD\par RAINA/sg

## 2023-06-05 NOTE — PHYSICAL EXAM
[de-identified] : Right Shoulder: \par Range of Motion in Degrees:   	\par    	                        Claimant:          Normal:  	\par Abduction (Active)  	180  	180 degrees  	\par Abduction (Passive)  	180  	180 degrees  	\par Forward elevation (Active):  	180  	180 degrees  	\par Forward elevation (Passive):  180  	180 degrees  	\par External rotation (Active):  	45  	45 degrees  	\par External rotation (Passive):  	45  	45 degrees  	\par Internal rotation (Active):  	L-1  	L-1  	\par Internal rotation (Passive):  	L-1  	L-1  	\par \par No motor weakness to internal rotation, external rotation or abduction in the scapular plane.  Negative crank test.  Negative O’Poncho’s test.  Negative Speed’s test. Negative Yergason’s test.  Negative cross arm test.  No tenderness to palpation at the AC joint. Negative Hawkin’s sign.  Negative Neer’s sign.  Negative apprehension. Negative sulcus sign.  No gross neurological or vascular deficits distally.  Skin is intact.  No rashes, scars or lesions. 2+ radial and ulnar pulses. No extra-articular swelling or tenderness. \par \par Left Shoulder:  \par Range of Motion in Degrees:	\par 	                                  Claimant:	Normal:	\par Abduction (Active)	                  180	               180 degrees	\par Abduction (Passive)	  180	               180 degrees	\par Forward elevation (Active):	  180	               180 degrees	\par Forward elevation (Passive):	  180	               180 degrees	\par External rotation (Active):	   45	               45 degrees	\par External rotation (Passive):	   45	               45 degrees	\par Internal rotation (Active):	   L-1	               L-1	\par Internal rotation (Passive):	   L-1	               L-1	\par  \par No motor weakness to internal rotation, external rotation or abduction in the scapular plane.  Negative crank test.  Negative O’Poncho’s test.  Negative Speed’s test. Negative Yergason’s test.  Negative cross arm test.  No tenderness to palpation at the AC joint. Positive Hawkin’s sign.  Positive Neer’s sign. Negative apprehension. Negative sulcus sign.  No gross neurological or vascular deficits distally.  Skin is intact.  No rashes, scars or lesions. 2+ radial and ulnar pulses. No extra-articular swelling or tenderness.\par   [de-identified] : Ambulating with a normal gait. [de-identified] : Appearance:  Well-developed, well-nourished male in no acute distress.\par  [de-identified] : Radiographs, which were taken in the office today, two views of the left shoulder, show calcific tendinitis.

## 2023-06-05 NOTE — DISCUSSION/SUMMARY
[de-identified] : At this time, due to calcific tendinitis of the left shoulder, I recommend ice and elevation. He will be reassessed in three to four weeks.

## 2023-06-05 NOTE — HISTORY OF PRESENT ILLNESS
[de-identified] : The patient comes in today with complaints to his left shoulder. He denies any inciting traumatic events. The patient states the onset/injury occurred on 3/21/2023. This injury is not work related or due to an automobile accident. The patient states the pain is constant. The patient describes the pain as sharp. The patient notes heat and ice makes his symptoms better. The patient indicates a pain level of 8 on a pain scale of 0-10.  [] : No

## 2023-06-06 ENCOUNTER — APPOINTMENT (OUTPATIENT)
Dept: NEUROSURGERY | Facility: CLINIC | Age: 68
End: 2023-06-06
Payer: MEDICARE

## 2023-06-06 VITALS
HEART RATE: 88 BPM | WEIGHT: 175 LBS | SYSTOLIC BLOOD PRESSURE: 142 MMHG | HEIGHT: 68 IN | DIASTOLIC BLOOD PRESSURE: 88 MMHG | OXYGEN SATURATION: 95 % | BODY MASS INDEX: 26.52 KG/M2

## 2023-06-06 DIAGNOSIS — R51.9 HEADACHE, UNSPECIFIED: ICD-10-CM

## 2023-06-06 DIAGNOSIS — H71.21 CHOLESTEATOMA OF MASTOID, RIGHT EAR: ICD-10-CM

## 2023-06-06 DIAGNOSIS — H90.3 SENSORINEURAL HEARING LOSS, BILATERAL: ICD-10-CM

## 2023-06-06 PROCEDURE — 99203 OFFICE O/P NEW LOW 30 MIN: CPT

## 2023-06-07 PROBLEM — R51.9 FREQUENT HEADACHES: Status: ACTIVE | Noted: 2023-06-07

## 2023-06-07 PROBLEM — H90.3 SENSORINEURAL HEARING LOSS (SNHL) OF BOTH EARS: Status: ACTIVE | Noted: 2023-06-07

## 2023-06-07 PROBLEM — H71.21: Status: ACTIVE | Noted: 2023-06-07

## 2023-06-07 NOTE — CONSULT LETTER
[Dear  ___] : Dear  [unfilled], [Courtesy Letter:] : I had the pleasure of seeing your patient, [unfilled], in my office today. [Sincerely,] : Sincerely, [FreeTextEntry2] : Ruben Coughlin MD\par [address]\[place] [address]\par  [FreeTextEntry1] : This very pleasant 68-year-old gentleman is retired. He presents with problems with head pain and changes in his hearing over the last four months. 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 The patient was seen at [facility] and underwent a formal CT angiogram which does not identify any vascular abnormality. It still does not 100% preclude the possibility of a thrombosed aneurysm. The patient's headaches were not thunderclap or sudden in onset. They have been progressing for some time. He has been seen by ENT for debridement of earwax, but this has not changed either his headache pattern or his hearing quality.\par \par I have reviewed with the patient in detail his MRI images performed at [facility] as well as the CT and CT angiogram performed at [facility]. Both sets of imaging do show an extensive inflammatory collection filling the right mastoid bone. This is suspicious for either mastoiditis or possibly a cholesteatoma. No other mast lesions are identified. The ventricles are of normal size. There are no significant diffuse white matter changes that would suggest microvascular disease or migraine.\par \par On examination, the patient's neck is supple. The patient has a conductive hearing change on the right-hand side. Examination of the ears using the otoscope shows fullness in the right ear canal but no breach of the dermis. There is no specific increase in pain to percussion of the mastoid bone on the right-hand side. Otherwise, the patient's examination is normal with notation of his previous right eye surgery when he was a child. He has equal and normal strength in both the upper and lower extremities. His balance is good. There's no pronator drift. The Romberg test is negative.\par \par I have indicated to the patient that based on the original MRI exam; it is worthwhile considering doing an additional CT angiogram in six months to definitively rule out an aneurysm of the anterior circulation. At this point in time, I do not have any significant concerns, but it is prudent to be certain. I have asked for the patient to return to his ENT doctors for further evaluation of the collection within the mastoid cells on the right-hand side. This may require a biopsy or surgical drainage. A cholesteatoma may require more extensive evacuation and reconstruction. In addition, it would be appropriate for the patient to undergo a formal audiogram to assess his hearing capacity. \par \par Thank you for very kindly including me in the evaluation and treatment of your patient. Please do not hesitate to contact me should you have any questions or concerns regarding this evaluation or the patient's ongoing follow-up care plan.  [FreeTextEntry3] : Dre Rodriguez MD, PhD, FRCPSC                            \par Attending Neurosurgeon   of Neurosurgery  [facility] \par  [address], [address]nd [place] [address] \par  Office: [phone]  Fax: [phone]  [DrBry  ___] : Dr. BLANCAS

## 2023-06-08 ENCOUNTER — APPOINTMENT (OUTPATIENT)
Dept: ORTHOPEDIC SURGERY | Facility: CLINIC | Age: 68
End: 2023-06-08
Payer: MEDICARE

## 2023-06-08 VITALS
HEIGHT: 68 IN | SYSTOLIC BLOOD PRESSURE: 138 MMHG | HEART RATE: 85 BPM | WEIGHT: 175 LBS | DIASTOLIC BLOOD PRESSURE: 88 MMHG | BODY MASS INDEX: 26.52 KG/M2

## 2023-06-08 DIAGNOSIS — M75.31 CALCIFIC TENDINITIS OF RIGHT SHOULDER: ICD-10-CM

## 2023-06-08 DIAGNOSIS — M75.32 CALCIFIC TENDINITIS OF RIGHT SHOULDER: ICD-10-CM

## 2023-06-08 DIAGNOSIS — M75.41 IMPINGEMENT SYNDROME OF RIGHT SHOULDER: ICD-10-CM

## 2023-06-08 PROCEDURE — 73030 X-RAY EXAM OF SHOULDER: CPT | Mod: RT

## 2023-06-08 PROCEDURE — 99214 OFFICE O/P EST MOD 30 MIN: CPT

## 2023-06-08 PROCEDURE — 73560 X-RAY EXAM OF KNEE 1 OR 2: CPT | Mod: 50

## 2023-06-08 RX ORDER — HYALURONATE SODIUM 10 MG/ML
25 SYRINGE (ML) INTRAARTICULAR
Qty: 10 | Refills: 0 | Status: ACTIVE | OUTPATIENT
Start: 2023-06-08

## 2023-06-12 ENCOUNTER — APPOINTMENT (OUTPATIENT)
Dept: ORTHOPEDIC SURGERY | Facility: CLINIC | Age: 68
End: 2023-06-12
Payer: MEDICARE

## 2023-06-12 PROBLEM — M75.41 IMPINGEMENT SYNDROME OF RIGHT SHOULDER: Status: ACTIVE | Noted: 2023-06-08

## 2023-06-12 PROCEDURE — 20610 DRAIN/INJ JOINT/BURSA W/O US: CPT | Mod: 50

## 2023-06-12 NOTE — ADDENDUM
[FreeTextEntry1] : This note was written by Ros Watson on 06/12/2023 acting as a scribe for ALESHIA LEYVA III, MD

## 2023-06-12 NOTE — PHYSICAL EXAM
[de-identified] : Right Knee: \par Range of Motion in Degrees	\par 	                  Claimant:	Normal:	\par Flexion Active	  135 	                135-degrees	\par Flexion Passive	  135	                135-degrees	\par Extension Active	  0-5	                0-5-degrees	\par Extension Passive	  0-5	                0-5-degrees	\par \par No weakness to flexion/extension.  No evidence of instability in the AP plane or varus or valgus stress.  Negative  Lachman.  Negative pivot shift.  Negative anterior drawer test.  Negative posterior drawer test.  Negative Marshall.  Negative Apley grind.  No medial or lateral joint line tenderness.  Positive tenderness over the medial and lateral facet of the patella.  Positive patellofemoral crepitations.  No lateral tilting patella.  No patella apprehension.  Positive crepitation in the medial and lateral femoral condyle.  No proximal or distal swelling, edema or tenderness.  No gross motor or sensory deficits.  Mild intra-articular swelling.  2+ DP and PT pulses.  No varus or valgus malalignment.  Skin is intact.  No rashes, scars or lesions. \par \par Left Knee: \par Range of Motion in Degrees	\par 	                  Claimant:	Normal:	\par Flexion Active	  135 	                135-degrees	\par Flexion Passive	  135	                135-degrees	\par Extension Active	  0-5	                0-5-degrees	\par Extension Passive	  0-5	                0-5-degrees	\par \par No weakness to flexion/extension.  No evidence of instability in the AP plane or varus or valgus stress.  Negative  Lachman.  Negative pivot shift.  Negative anterior drawer test.  Negative posterior drawer test.  Negative Marshall.  Negative Apley grind.  No medial or lateral joint line tenderness.  Positive tenderness over the medial and lateral facet of the patella.  Positive patellofemoral crepitations.  No lateral tilting patella.  No patella apprehension.  Positive crepitation in the medial and lateral femoral condyle.  No proximal or distal swelling, edema or tenderness.  No gross motor or sensory deficits.  Mild intra-articular swelling.  2+ DP and PT pulses.  No varus or valgus malalignment.  Skin is intact.  No rashes, scars or lesions. \par \par Right Shoulder:  \par Range of Motion in Degrees:	\par 	                                  Claimant:	Normal:	\par Abduction (Active)	                  180	               180 degrees	\par Abduction (Passive)	  180	               180 degrees	\par Forward elevation (Active):	  180	               180 degrees	\par Forward elevation (Passive):	  180	               180 degrees	\par External rotation (Active):	   45	               45 degrees	\par External rotation (Passive):	   45	               45 degrees	\par Internal rotation (Active):	   L-1	               L-1	\par Internal rotation (Passive):	   L-1	               L-1	\par  \par No motor weakness to internal rotation, external rotation or abduction in the scapular plane.  Negative crank test.  Negative O’Poncho’s test.  Negative Speed’s test. Negative Yergason’s test.  Negative cross arm test.  No tenderness to palpation at the AC joint. Positive Hawkin’s sign.  Positive Neer’s sign. Negative apprehension. Negative sulcus sign.  No gross neurological or vascular deficits distally.  Skin is intact.  No rashes, scars or lesions. 2+ radial and ulnar pulses. No extra-articular swelling or tenderness.\par  \par Left Shoulder: \par Range of Motion in Degrees:   	\par    	                        Claimant:  	Normal:  	\par Abduction (Active)  	180  	180 degrees  	\par Abduction (Passive)  	180  	180 degrees  	\par Forward elevation (Active):  	180  	180 degrees  	\par Forward elevation (Passive):  180  	180 degrees  	\par External rotation (Active):  	45  	45 degrees  	\par External rotation (Passive):  	45  	45 degrees  	\par Internal rotation (Active):  	L-1  	L-1  	\par Internal rotation (Passive):  	L-1  	L-1  \par 	\par No motor weakness to internal rotation, external rotation or abduction in the scapular plane.  Negative crank test.  Negative O’Poncho’s test.  Negative Speed’s test. Negative Yergason’s test.  Negative cross arm test.  No tenderness to palpation at the AC joint. Negative Hawkin’s sign.  Negative Neer’s sign.  Negative apprehension. Negative sulcus sign.  No gross neurological or vascular deficits distally.  Skin is intact.  No rashes, scars or lesions. 2+ radial and ulnar pulses. No extra-articular swelling or tenderness.\par   [de-identified] : Ambulating with a slightly antalgic to antalgic gait.  Station:  Normal.  [de-identified] : Appearance:  Well-developed, well-nourished male in no acute distress.\par   [de-identified] : Radiographs, which were taken in the office today, two views of the right shoulder, show no obvious osseous abnormalities.\par \par Radiographs, which were taken in the office today, one-two views of the right knee and one-two views of the left knee, including AP Standing, show late moderate degenerative changes of the bilateral knees.

## 2023-06-12 NOTE — DISCUSSION/SUMMARY
[de-identified] : At this time, due to resolved calcific tendinitis of the left shoulder and impingement of the right shoulder, he will start on a course of therapy.  As far as the osteoarthritis of the bilateral knees, I recommend viscosupplementation.\par \par

## 2023-06-12 NOTE — HISTORY OF PRESENT ILLNESS
[de-identified] : The patient comes in today stating that the left shoulder is feeling good, but he is starting to have increasing complaints of pain to his right shoulder. He states it is not as bad as it was. He is also having complaints to his bilateral knees. He has been seen and treated in the past with physical therapy and cortisone, but he is having persistent complaints.

## 2023-06-13 NOTE — ADDENDUM
[FreeTextEntry1] : This note was written by William Plascencia on 06/13/2023, acting as a scribe for Vasiliy Matias III, MD

## 2023-06-13 NOTE — PHYSICAL EXAM
[de-identified] : Right Knee: Range of Motion in Degrees	\par 	                  Claimant:	Normal:	\par Flexion Active	  135 	                135-degrees	\par Flexion Passive	  135	                135-degrees	\par Extension Active	  0-5	                0-5-degrees	\par Extension Passive	  0-5	                0-5-degrees	\par \par No weakness to flexion/extension.  No evidence of instability in the AP plane or varus or valgus stress.  Negative  Lachman.  Negative pivot shift.  Negative anterior drawer test.  Negative posterior drawer test.  Negative Marshall.  Negative Apley grind.  No medial or lateral joint line tenderness.  Positive tenderness over the medial and lateral facet of the patella.  Positive patellofemoral crepitations.  No lateral tilting patella.  No patella apprehension.  Positive crepitation in the medial and lateral femoral condyle.  No proximal or distal swelling, edema or tenderness.  No gross motor or sensory deficits.  Mild intra-articular swelling.  2+ DP and PT pulses.  No varus or valgus malalignment.  Skin is intact.  No rashes, scars or lesions.  \par \par Left Knee: Range of Motion in Degrees	\par 	                  Claimant:	Normal:	\par Flexion Active	  135 	                135-degrees	\par Flexion Passive	  135	                135-degrees	\par Extension Active	  0-5	                0-5-degrees	\par Extension Passive	  0-5	                0-5-degrees	\par \par No weakness to flexion/extension.  No evidence of instability in the AP plane or varus or valgus stress.  Negative  Lachman.  Negative pivot shift.  Negative anterior drawer test.  Negative posterior drawer test.  Negative Marshall.  Negative Apley grind.  No medial or lateral joint line tenderness.  Positive tenderness over the medial and lateral facet of the patella.  Positive patellofemoral crepitations.  No lateral tilting patella.  No patella apprehension.  Positive crepitation in the medial and lateral femoral condyle.  No proximal or distal swelling, edema or tenderness.  No gross motor or sensory deficits.  Mild intra-articular swelling.  2+ DP and PT pulses.  No varus or valgus malalignment.  Skin is intact.  No rashes, scars or lesions. \par

## 2023-06-13 NOTE — PROCEDURE
[de-identified] : \par Indications:\par Osteoarthritis of right knee \par Osteoarthritis of left knee \par \par Consent:\par The risks and benefits of the procedure were discussed with the patient in detail.  Upon verbal consent of the patient, we proceeded with the GenVisc-850 injections as noted below.  \par \par Description of Procedure:\par After a sterile prep, the patient underwent a GenVisc-850 injection of 25 mg of Sodium Hyaluronate in a 2.5 mL syringe into the right and left knee.  The patient tolerated the procedures well.  There were no complications.  \par \par :  [employer]par NDC#  [address]-93\par Lot#:   [id number]\par Expiration Date:  05/31/2025\par \par Plan:\par I have recommended ice and elevation.  The patient will be reassessed in one week for the next GenVisc-850 injection for osteoarthritis of the right and left knee.\par

## 2023-06-21 ENCOUNTER — APPOINTMENT (OUTPATIENT)
Dept: ORTHOPEDIC SURGERY | Facility: CLINIC | Age: 68
End: 2023-06-21
Payer: MEDICARE

## 2023-06-21 PROCEDURE — 20610 DRAIN/INJ JOINT/BURSA W/O US: CPT | Mod: 50

## 2023-06-22 NOTE — ADDENDUM
[FreeTextEntry1] : This note was written by Loraine Davis on 06/22/2023 acting as scribe for Vasiliy Matias III, MD

## 2023-06-22 NOTE — PROCEDURE
[de-identified] : Indications:\par Osteoarthritis of right knee \par Osteoarthritis of left knee \par \par Consent:\par The risks and benefits of the procedures were discussed with the patient in detail.  Upon verbal consent of the patient, we proceeded with the GenVisc 850 injections as noted below.  \par \par Description of Procedure:\par After a sterile prep, the patient underwent a GenVisc 850 injection of 25 mg of Sodium Hyaluronate in a 2.5 mL syringe into the right and left knee.  The patient tolerated the procedures well.  There were no complications.  \par \par :  [employer]par NDC#  [address]-09\par Lot#:   [id number]\par Expiration Date:  05/31/2025\par \par Plan:\par I have recommended ice and elevation.  The patient will be reassessed in one week for the next GenVisc 850 injection for osteoarthritis of the right and left knee.\par

## 2023-06-22 NOTE — PHYSICAL EXAM
Pt notified and verbalized understanding.
[de-identified] : Right Knee:\par Knee:  Range of Motion in Degrees	\par 	                  Claimant:	Normal:	\par Flexion Active	  135 	                135-degrees	\par Flexion Passive	  135	                135-degrees	\par Extension Active	  0-5	                0-5-degrees	\par Extension Passive	  0-5	                0-5-degrees	\par \par No weakness to flexion/extension.  No evidence of instability in the AP plane or varus or valgus stress.  Negative  Lachman.  Negative pivot shift.  Negative anterior drawer test.  Negative posterior drawer test.  Negative Marshall.  Negative Apley grind.  No medial or lateral joint line tenderness.  Positive tenderness over the medial and lateral facet of the patella.  Positive patellofemoral crepitations.  No lateral tilting patella.  No patella apprehension.  Positive crepitation in the medial and lateral femoral condyle.  No proximal or distal swelling, edema or tenderness.  No gross motor or sensory deficits.  Mild intra-articular swelling.  2+ DP and PT pulses.  No varus or valgus malalignment.  Skin is intact.  No rashes, scars or lesions.  \par \par Left Knee: Range of Motion in Degrees	\par 	                  Claimant:	Normal:	\par Flexion Active	  135 	                135-degrees	\par Flexion Passive	  135	                135-degrees	\par Extension Active	  0-5	                0-5-degrees	\par Extension Passive	  0-5	                0-5-degrees	\par \par No weakness to flexion/extension.  No evidence of instability in the AP plane or varus or valgus stress.  Negative  Lachman.  Negative pivot shift.  Negative anterior drawer test.  Negative posterior drawer test.  Negative Marshall.  Negative Apley grind.  No medial or lateral joint line tenderness.  Positive tenderness over the medial and lateral facet of the patella.  Positive patellofemoral crepitations.  No lateral tilting patella.  No patella apprehension.  Positive crepitation in the medial and lateral femoral condyle.  No proximal or distal swelling, edema or tenderness.  No gross motor or sensory deficits.  Mild intra-articular swelling.  2+ DP and PT pulses.  No varus or valgus malalignment.  Skin is intact.  No rashes, scars or lesions.

## 2023-06-28 ENCOUNTER — APPOINTMENT (OUTPATIENT)
Dept: ORTHOPEDIC SURGERY | Facility: CLINIC | Age: 68
End: 2023-06-28
Payer: MEDICARE

## 2023-06-28 PROCEDURE — 20610 DRAIN/INJ JOINT/BURSA W/O US: CPT | Mod: 50

## 2023-06-29 NOTE — ADDENDUM
[FreeTextEntry1] : This note was written by Loraine Davis on 06/29/2023 acting as scribe for Vasiliy Matias III, MD

## 2023-06-29 NOTE — PROCEDURE
[de-identified] : Indications:\par Osteoarthritis of right knee \par Osteoarthritis of left knee \par \par Consent:\par The risks and benefits of the procedures were discussed with the patient in detail.  Upon verbal consent of the patient, we proceeded with the GenVisc 850 injections as noted below.  \par \par Description of Procedure:\par After a sterile prep, the patient underwent a GenVisc 850 injection of 25 mg of Sodium Hyaluronate in a 2.5 mL syringe into the right and left knee.  The patient tolerated the procedures well.  There were no complications.  \par \par :  [employer]par NDC#  [address]-09\par Lot#:   [id number]\par Expiration Date:  05/31/2025\par \par Plan:\par I have recommended ice and elevation.  The patient will be reassessed in one week for the next GenVisc 850 injection for osteoarthritis of the right and left knee.\par

## 2023-06-29 NOTE — PHYSICAL EXAM
[de-identified] : Right Knee:\par Knee:  Range of Motion in Degrees	\par 	                  Claimant:	Normal:	\par Flexion Active	  135 	                135-degrees	\par Flexion Passive	  135	                135-degrees	\par Extension Active	  0-5	                0-5-degrees	\par Extension Passive	  0-5	                0-5-degrees	\par \par No weakness to flexion/extension.  No evidence of instability in the AP plane or varus or valgus stress.  Negative  Lachman.  Negative pivot shift.  Negative anterior drawer test.  Negative posterior drawer test.  Negative Marshall.  Negative Apley grind.  No medial or lateral joint line tenderness.  Positive tenderness over the medial and lateral facet of the patella.  Positive patellofemoral crepitations.  No lateral tilting patella.  No patella apprehension.  Positive crepitation in the medial and lateral femoral condyle.  No proximal or distal swelling, edema or tenderness.  No gross motor or sensory deficits.  Mild intra-articular swelling.  2+ DP and PT pulses.  No varus or valgus malalignment.  Skin is intact.  No rashes, scars or lesions.  \par \par Left Knee: \par Knee:  Range of Motion in Degrees	\par 	                  Claimant:	Normal:	\par Flexion Active	  135 	                135-degrees	\par Flexion Passive	  135	                135-degrees	\par Extension Active	  0-5	                0-5-degrees	\par Extension Passive	  0-5	                0-5-degrees	\par \par No weakness to flexion/extension.  No evidence of instability in the AP plane or varus or valgus stress.  Negative  Lachman.  Negative pivot shift.  Negative anterior drawer test.  Negative posterior drawer test.  Negative Marshall.  Negative Apley grind.  No medial or lateral joint line tenderness.  Positive tenderness over the medial and lateral facet of the patella.  Positive patellofemoral crepitations.  No lateral tilting patella.  No patella apprehension.  Positive crepitation in the medial and lateral femoral condyle.  No proximal or distal swelling, edema or tenderness.  No gross motor or sensory deficits.  Mild intra-articular swelling.  2+ DP and PT pulses.  No varus or valgus malalignment.  Skin is intact.  No rashes, scars or lesions.

## 2023-07-05 ENCOUNTER — APPOINTMENT (OUTPATIENT)
Dept: ORTHOPEDIC SURGERY | Facility: CLINIC | Age: 68
End: 2023-07-05
Payer: MEDICARE

## 2023-07-05 PROCEDURE — 20610 DRAIN/INJ JOINT/BURSA W/O US: CPT | Mod: 50

## 2023-07-07 NOTE — ADDENDUM
[FreeTextEntry1] : This note was written by Jane Hogan on 07/07/2023 acting as scribe for Vasiliy Matias III, MD

## 2023-07-07 NOTE — PHYSICAL EXAM
[de-identified] : Right Knee:\par Knee:  Range of Motion in Degrees	\par 	                  Claimant:	Normal:	\par Flexion Active	  135 	                135-degrees	\par Flexion Passive	  135	                135-degrees	\par Extension Active	  0-5	                0-5-degrees	\par Extension Passive	  0-5	                0-5-degrees	\par \par No weakness to flexion/extension.  No evidence of instability in the AP plane or varus or valgus stress.  Negative  Lachman.  Negative pivot shift.  Negative anterior drawer test.  Negative posterior drawer test.  Negative Marshall.  Negative Apley grind.  No medial or lateral joint line tenderness.  Positive tenderness over the medial and lateral facet of the patella.  Positive patellofemoral crepitations.  No lateral tilting patella.  No patella apprehension.  Positive crepitation in the medial and lateral femoral condyle.  No proximal or distal swelling, edema or tenderness.  No gross motor or sensory deficits.  Mild intra-articular swelling.  2+ DP and PT pulses.  No varus or valgus malalignment.  Skin is intact.  No rashes, scars or lesions.  \par \par Left Knee: \par Knee:  Range of Motion in Degrees	\par 	                  Claimant:	Normal:	\par Flexion Active	  135 	                135-degrees	\par Flexion Passive	  135	                135-degrees	\par Extension Active	  0-5	                0-5-degrees	\par Extension Passive	  0-5	                0-5-degrees	\par \par No weakness to flexion/extension.  No evidence of instability in the AP plane or varus or valgus stress.  Negative  Lachman.  Negative pivot shift.  Negative anterior drawer test.  Negative posterior drawer test.  Negative Marshall.  Negative Apley grind.  No medial or lateral joint line tenderness.  Positive tenderness over the medial and lateral facet of the patella.  Positive patellofemoral crepitations.  No lateral tilting patella.  No patella apprehension.  Positive crepitation in the medial and lateral femoral condyle.  No proximal or distal swelling, edema or tenderness.  No gross motor or sensory deficits.  Mild intra-articular swelling.  2+ DP and PT pulses.  No varus or valgus malalignment.  Skin is intact.  No rashes, scars or lesions.

## 2023-07-07 NOTE — PROCEDURE
[de-identified] : Consent:\par The risks and benefits of the procedure were discussed with the patient in detail.  Upon verbal consent of the patient, we proceeded with the GenVisc 850 injections as noted below.  \par \par Indications: Osteoarthritis, bilateral knees\par : [employer]par NDC#: [address]-06\par Lot#:   [id number]\par Expiration: 05/31/25\par \par Procedure:\par After sterile prep, the patient underwent a GenVisc 850 injection of 25 mg of sodium hyaluronate in a 2.5 mL syringe into the right and left knee.  The patient tolerated the procedures well.  There were no complications.  \par \par Plan:\par I have recommended ice and elevation.  The patient will be reassessed in one week for the next GenVisc 850 injection for the  osteoarthritis of bilateral knees. \par

## 2023-07-12 ENCOUNTER — APPOINTMENT (OUTPATIENT)
Dept: ORTHOPEDIC SURGERY | Facility: CLINIC | Age: 68
End: 2023-07-12
Payer: MEDICARE

## 2023-07-12 PROCEDURE — 20610 DRAIN/INJ JOINT/BURSA W/O US: CPT | Mod: 50

## 2023-07-13 NOTE — PHYSICAL EXAM
[de-identified] : Right Knee:\par Knee:  Range of Motion in Degrees	\par 	                  Claimant:	Normal:	\par Flexion Active	  135 	                135-degrees	\par Flexion Passive	  135	                135-degrees	\par Extension Active	  0-5	                0-5-degrees	\par Extension Passive	  0-5	                0-5-degrees	\par \par No weakness to flexion/extension.  No evidence of instability in the AP plane or varus or valgus stress.  Negative  Lachman.  Negative pivot shift.  Negative anterior drawer test.  Negative posterior drawer test.  Negative Marshall.  Negative Apley grind.  No medial or lateral joint line tenderness.  Positive tenderness over the medial and lateral facet of the patella.  Positive patellofemoral crepitations.  No lateral tilting patella.  No patella apprehension.  Positive crepitation in the medial and lateral femoral condyle.  No proximal or distal swelling, edema or tenderness.  No gross motor or sensory deficits.  Mild intra-articular swelling.  2+ DP and PT pulses.  No varus or valgus malalignment.  Skin is intact.  No rashes, scars or lesions.  \par \par Left Knee: \par Knee:  Range of Motion in Degrees	\par 	                  Claimant:	Normal:	\par Flexion Active	  135 	                135-degrees	\par Flexion Passive	  135	                135-degrees	\par Extension Active	  0-5	                0-5-degrees	\par Extension Passive	  0-5	                0-5-degrees	\par \par No weakness to flexion/extension.  No evidence of instability in the AP plane or varus or valgus stress.  Negative  Lachman.  Negative pivot shift.  Negative anterior drawer test.  Negative posterior drawer test.  Negative Marshall.  Negative Apley grind.  No medial or lateral joint line tenderness.  Positive tenderness over the medial and lateral facet of the patella.  Positive patellofemoral crepitations.  No lateral tilting patella.  No patella apprehension.  Positive crepitation in the medial and lateral femoral condyle.  No proximal or distal swelling, edema or tenderness.  No gross motor or sensory deficits.  Mild intra-articular swelling.  2+ DP and PT pulses.  No varus or valgus malalignment.  Skin is intact.  No rashes, scars or lesions.

## 2023-07-13 NOTE — PROCEDURE
[de-identified] : Indication:\par Osteoarthritis right knee\par Osteoarthritis left knee\par \par Consent:\par The risks and benefits of the procedures were discussed with the patient in detail.  Upon verbal consent of the patient, we proceeded with the GenVisc 850 injections as noted below.  \par \par Procedure:\par After sterile prep, the patient underwent a GenVisc 850 injection of 25 mg of sodium hyaluronate in a 2.5 mL syringe into the right and left knee.  The patient tolerated the procedures well.  There were no complications.  \par \par : [employer]par NDC#: [address]-35\par Lot#:   [id number]\par Expiration: 05/31/2025\par \par Plan:\par I have recommended ice and elevation.  The patient will be reassessed in 6-8 weeks for the  osteoarthritis of bilateral knees. \par

## 2023-07-13 NOTE — ADDENDUM
[FreeTextEntry1] : This note was written by Loraine Davis on 07/13/2023 acting as scribe for Vasiliy Matias III, MD

## 2023-09-07 ENCOUNTER — APPOINTMENT (OUTPATIENT)
Dept: ORTHOPEDIC SURGERY | Facility: CLINIC | Age: 68
End: 2023-09-07
Payer: MEDICARE

## 2023-09-07 VITALS
DIASTOLIC BLOOD PRESSURE: 78 MMHG | WEIGHT: 175 LBS | BODY MASS INDEX: 26.52 KG/M2 | HEIGHT: 68 IN | HEART RATE: 83 BPM | SYSTOLIC BLOOD PRESSURE: 119 MMHG

## 2023-09-07 DIAGNOSIS — M75.42 IMPINGEMENT SYNDROME OF LEFT SHOULDER: ICD-10-CM

## 2023-09-07 PROCEDURE — 99212 OFFICE O/P EST SF 10 MIN: CPT | Mod: 25

## 2023-09-07 PROCEDURE — 20610 DRAIN/INJ JOINT/BURSA W/O US: CPT | Mod: LT

## 2023-12-05 ENCOUNTER — APPOINTMENT (OUTPATIENT)
Dept: NEUROSURGERY | Facility: CLINIC | Age: 68
End: 2023-12-05

## 2024-01-09 ENCOUNTER — APPOINTMENT (OUTPATIENT)
Dept: ORTHOPEDIC SURGERY | Facility: CLINIC | Age: 69
End: 2024-01-09
Payer: MEDICARE

## 2024-01-09 VITALS
HEIGHT: 68 IN | WEIGHT: 170 LBS | HEART RATE: 87 BPM | SYSTOLIC BLOOD PRESSURE: 118 MMHG | BODY MASS INDEX: 25.76 KG/M2 | DIASTOLIC BLOOD PRESSURE: 79 MMHG

## 2024-01-09 PROCEDURE — 99213 OFFICE O/P EST LOW 20 MIN: CPT

## 2024-01-09 RX ORDER — HYALURONATE SODIUM 10 MG/ML
25 SYRINGE (ML) INTRAARTICULAR
Qty: 10 | Refills: 0 | Status: ACTIVE | OUTPATIENT
Start: 2024-01-09

## 2024-01-16 ENCOUNTER — APPOINTMENT (OUTPATIENT)
Dept: ORTHOPEDIC SURGERY | Facility: CLINIC | Age: 69
End: 2024-01-16
Payer: MEDICARE

## 2024-01-16 PROCEDURE — 20610 DRAIN/INJ JOINT/BURSA W/O US: CPT | Mod: 50

## 2024-01-17 NOTE — PHYSICAL EXAM
[de-identified] :   Right Knee: Range of Motion in Degrees                    Claimant: Normal: Flexion Active   135                  135-degrees Flexion Passive   135                 135-degrees Extension Active   0-5                 0-5-degrees Extension Passive   0-5                 0-5-degrees   No weakness to flexion/extension.  No evidence of instability in the AP plane on varus or valgus stress.  Negative Lachman.  Negative pivot shift.  Negative anterior drawer test.  Negative posterior drawer test.  Negative Marshall.  Negative Apley grind.  No medial or lateral joint line tenderness.  Positive tenderness over the medial and lateral facet of the patella.  Positive patellofemoral crepitations.  No lateral tilting patella.  No patella apprehension.  Positive crepitation in the medial and lateral femoral condyle.  No proximal or distal swelling, edema or tenderness.  No gross motor or sensory deficits.  Mild intra-articular swelling.  2+ DP and PT pulses.  No varus or valgus malalignment.  Skin is intact.  No rashes, scars or lesions.   Left Knee: Range of Motion in Degrees                    Claimant: Normal: Flexion Active   135                  135-degrees Flexion Passive   135                 135-degrees Extension Active   0-5                 0-5-degrees Extension Passive   0-5                 0-5-degrees   No weakness to flexion/extension.  No evidence of instability in the AP plane on varus or valgus stress.  Negative Lachman.  Negative pivot shift.  Negative anterior drawer test.  Negative posterior drawer test.  Negative Marshall.  Negative Apley grind.  No medial or lateral joint line tenderness.  Positive tenderness over the medial and lateral facet of the patella.  Positive patellofemoral crepitations.  No lateral tilting patella.  No patella apprehension.  Positive crepitation in the medial and lateral femoral condyle.  No proximal or distal swelling, edema or tenderness.  No gross motor or sensory deficits.  Mild intra-articular swelling.  2+ DP and PT pulses.  No varus or valgus malalignment.  Skin is intact.  No rashes, scars or lesions.   [de-identified] : Gait and Station:  Ambulating with a slightly antalgic to antalgic gait.  Normal Station.  [de-identified] : Appearance:  Well-developed, well-nourished male in no acute distress.

## 2024-01-17 NOTE — ADDENDUM
[FreeTextEntry1] : This note was written by William Plascencia on 01/10/2024, acting as a scribe for NAVYA BENÍTEZ, LEATHA/L, PA

## 2024-01-23 ENCOUNTER — APPOINTMENT (OUTPATIENT)
Dept: ORTHOPEDIC SURGERY | Facility: CLINIC | Age: 69
End: 2024-01-23
Payer: MEDICARE

## 2024-01-23 PROCEDURE — 20610 DRAIN/INJ JOINT/BURSA W/O US: CPT | Mod: 50

## 2024-01-24 NOTE — REASON FOR VISIT
[FreeTextEntry2] : the first GenVisc-850 injection to his bilateral knees Note Amended 01/24/2024 sg

## 2024-01-24 NOTE — PHYSICAL EXAM
[de-identified] : Right Knee: Range of Motion in Degrees   Claimant: Normal: Flexion Active 135   135-degrees Flexion Passive 135   135-degrees Extension Active 0-5   0-5-degrees Extension Passive 0-5   0-5-degrees  No weakness to flexion/extension. No evidence of instability in the AP plane on varus or valgus stress. Negative Lachman. Negative pivot shift. Negative anterior drawer test. Negative posterior drawer test. Negative Marshall. Negative Apley grind. No medial or lateral joint line tenderness. Positive tenderness over the medial and lateral facet of the patella. Positive patellofemoral crepitations. No lateral tilting patella. No patella apprehension. Positive crepitation in the medial and lateral femoral condyle. No proximal or distal swelling, edema or tenderness. No gross motor or sensory deficits. Mild intra-articular swelling. 2+ DP and PT pulses. No varus or valgus malalignment. Skin is intact. No rashes, scars or lesions.  Left Knee: Range of Motion in Degrees   Claimant: Normal: Flexion Active 135   135-degrees Flexion Passive 135   135-degrees Extension Active 0-5   0-5-degrees Extension Passive 0-5   0-5-degrees  No weakness to flexion/extension. No evidence of instability in the AP plane on varus or valgus stress. Negative Lachman. Negative pivot shift. Negative anterior drawer test. Negative posterior drawer test. Negative Marshall. Negative Apley grind. No medial or lateral joint line tenderness. Positive tenderness over the medial and lateral facet of the patella. Positive patellofemoral crepitations. No lateral tilting patella. No patella apprehension. Positive crepitation in the medial and lateral femoral condyle. No proximal or distal swelling, edema or tenderness. No gross motor or sensory deficits. Mild intra-articular swelling. 2+ DP and PT pulses. No varus or valgus malalignment. Skin is intact. No rashes, scars or lesions.

## 2024-01-24 NOTE — PROCEDURE
[de-identified] : Indication: Osteoarthritis of the bilateral knees Consent: The risks and benefits of the procedure were discussed with the patient in detail. Upon verbal consent of the patient, we proceeded with the GenVisc 850 injection as noted below.  Description of Procedure: After a sterile prep, the patient underwent a GenVisc 850 injection of 25 mg of Sodium Hyaluronate in a 2.5 ML syringe into the right and left knee. The patient tolerated the procedure well. There were no complications.  : [employer] NDC #: [address]-02 Lot #: T-2 Expiration: 03/31/2026  Plan: The patient will be reassessed in a week for the next GenVisc 850 injection for osteoarthritis of the right and left knee. I have recommended ice and elevation.

## 2024-01-24 NOTE — PROCEDURE
[de-identified] : Indication: Osteoarthritis of the bilateral knees Consent: The risks and benefits of the procedure were discussed with the patient in detail.  Upon verbal consent of the patient, we proceeded with the GenVisc 850 injection as noted below.  Description of Procedure: After a sterile prep, the patient underwent a GenVisc 850 injection of 25 mg of Sodium Hyaluronate in a 2.5 ML syringe into the right and left knee.  The patient tolerated the procedure well.  There were no complications.    :  [employer] NDC #:  [address]-22 Lot #:     T-2 Expiration: 03/31/2026  Plan: The patient will be reassessed in a week for the next GenVisc 850 injection for osteoarthritis of the right and left knee.  I have recommended ice and elevation.

## 2024-01-24 NOTE — ADDENDUM
[FreeTextEntry1] : This note was written by William Plascencia on 01/17/2024, acting as a scribe for NAVYA BENÍTEZ, LEATHA/L, PA

## 2024-01-24 NOTE — PHYSICAL EXAM
[de-identified] :  Right Knee: Range of Motion in Degrees                    Claimant: Normal: Flexion Active   135                  135-degrees Flexion Passive   135                 135-degrees Extension Active   0-5                 0-5-degrees Extension Passive   0-5                 0-5-degrees   No weakness to flexion/extension.  No evidence of instability in the AP plane on varus or valgus stress.  Negative Lachman.  Negative pivot shift.  Negative anterior drawer test.  Negative posterior drawer test.  Negative Marshall.  Negative Apley grind.  No medial or lateral joint line tenderness.  Positive tenderness over the medial and lateral facet of the patella.  Positive patellofemoral crepitations.  No lateral tilting patella.  No patella apprehension.  Positive crepitation in the medial and lateral femoral condyle.  No proximal or distal swelling, edema or tenderness.  No gross motor or sensory deficits.  Mild intra-articular swelling.  2+ DP and PT pulses.  No varus or valgus malalignment.  Skin is intact.  No rashes, scars or lesions.   Left Knee: Range of Motion in Degrees                    Claimant: Normal: Flexion Active   135                  135-degrees Flexion Passive   135                 135-degrees Extension Active   0-5                 0-5-degrees Extension Passive   0-5                 0-5-degrees   No weakness to flexion/extension.  No evidence of instability in the AP plane on varus or valgus stress.  Negative Lachman.  Negative pivot shift.  Negative anterior drawer test.  Negative posterior drawer test.  Negative Marshall.  Negative Apley grind.  No medial or lateral joint line tenderness.  Positive tenderness over the medial and lateral facet of the patella.  Positive patellofemoral crepitations.  No lateral tilting patella.  No patella apprehension.  Positive crepitation in the medial and lateral femoral condyle.  No proximal or distal swelling, edema or tenderness.  No gross motor or sensory deficits.  Mild intra-articular swelling.  2+ DP and PT pulses.  No varus or valgus malalignment.  Skin is intact.  No rashes, scars or lesions.

## 2024-01-24 NOTE — ADDENDUM
[FreeTextEntry1] : This note was written by Ros Watson on 01/24/2024 acting as scribe for Tish Markham, OTR/L, PA 
no

## 2024-01-30 ENCOUNTER — APPOINTMENT (OUTPATIENT)
Dept: ORTHOPEDIC SURGERY | Facility: CLINIC | Age: 69
End: 2024-01-30
Payer: MEDICARE

## 2024-01-30 PROCEDURE — 20610 DRAIN/INJ JOINT/BURSA W/O US: CPT | Mod: 50

## 2024-01-31 NOTE — ADDENDUM
[FreeTextEntry1] : This note was written by Ros Watson on 01/31/2024 acting as scribe for Tish Markham, OTR/L, PA

## 2024-01-31 NOTE — PROCEDURE
[de-identified] : Indication: Osteoarthritis of the bilateral knees Consent: The risks and benefits of the procedure were discussed with the patient in detail. Upon verbal consent of the patient, we proceeded with the GenVisc 850 injection as noted below.  Description of Procedure: After a sterile prep, the patient underwent a GenVisc 850 injection of 25 mg of Sodium Hyaluronate in a 2.5 ML syringe into the right and left knee. The patient tolerated the procedure well. There were no complications.  : [employer] NDC #: [address]-52 Lot #: T-2 Expiration: 03/31/2026  Plan: The patient will be reassessed in a week for the next GenVisc 850 injection for osteoarthritis of the right and left knee. I have recommended ice and elevation.

## 2024-01-31 NOTE — PHYSICAL EXAM
[de-identified] : Right Knee: Range of Motion in Degrees Claimant: Normal: Flexion Active 135 135-degrees Flexion Passive 135 135-degrees Extension Active 0-5 0-5-degrees Extension Passive 0-5 0-5-degrees  No weakness to flexion/extension. No evidence of instability in the AP plane on varus or valgus stress. Negative Lachman. Negative pivot shift. Negative anterior drawer test. Negative posterior drawer test. Negative Marshall. Negative Apley grind. No medial or lateral joint line tenderness. Positive tenderness over the medial and lateral facet of the patella. Positive patellofemoral crepitations. No lateral tilting patella. No patella apprehension. Positive crepitation in the medial and lateral femoral condyle. No proximal or distal swelling, edema or tenderness. No gross motor or sensory deficits. Mild intra-articular swelling. 2+ DP and PT pulses. No varus or valgus malalignment. Skin is intact. No rashes, scars or lesions.  Left Knee: Range of Motion in Degrees Claimant: Normal: Flexion Active 135 135-degrees Flexion Passive 135 135-degrees Extension Active 0-5 0-5-degrees Extension Passive 0-5 0-5-degrees  No weakness to flexion/extension. No evidence of instability in the AP plane on varus or valgus stress. Negative Lachman. Negative pivot shift. Negative anterior drawer test. Negative posterior drawer test. Negative Marshall. Negative Apley grind. No medial or lateral joint line tenderness. Positive tenderness over the medial and lateral facet of the patella. Positive patellofemoral crepitations. No lateral tilting patella. No patella apprehension. Positive crepitation in the medial and lateral femoral condyle. No proximal or distal swelling, edema or tenderness. No gross motor or sensory deficits. Mild intra-articular swelling. 2+ DP and PT pulses. No varus or valgus malalignment. Skin is intact. No rashes, scars or lesions.

## 2024-02-06 ENCOUNTER — APPOINTMENT (OUTPATIENT)
Dept: ORTHOPEDIC SURGERY | Facility: CLINIC | Age: 69
End: 2024-02-06
Payer: MEDICARE

## 2024-02-06 PROCEDURE — 20610 DRAIN/INJ JOINT/BURSA W/O US: CPT | Mod: 50

## 2024-02-07 VITALS — BODY MASS INDEX: 25.76 KG/M2 | HEIGHT: 68 IN | WEIGHT: 170 LBS

## 2024-02-07 NOTE — DISCUSSION/SUMMARY
[de-identified] : The patient presents with patellar tendinitis, left knee.  The patient is going to continue physical therapy, try to get back to most of his activities and return back to the office in six to eight weeks.

## 2024-02-07 NOTE — ADDENDUM
[FreeTextEntry1] : This note was written by Jane Hogan on 02/07/2024 acting as scribe for Tish Markham OTR/LOVE, PA.

## 2024-02-07 NOTE — HISTORY OF PRESENT ILLNESS
[de-identified] : Dipak comes in today with patellar tendinitis of the left knee.  He states he is doing much better.  He has been going to physical therapy on a consistent basis.  He has gotten much stronger.  He is getting back to his regular activities.

## 2024-02-07 NOTE — PHYSICAL EXAM
[de-identified] : Left knee: Knee: Range of Motion in Degrees	 	                  Claimant:	Normal:	 Flexion Active	  135 	                135-degrees	 Flexion Passive	  135	        135-degrees	 Extension Active	  0-5	                0-5-degrees	 Extension Passive	  0-5	                0-5-degrees	  He does not even have tenderness throughout the patellar tendon.  He just has a lot of weakness in the quad compared to the right quad.  He has significant tightness in the hamstrings.  He can squat, but not fully down at this time.  No evidence of instability in the AP plane or varus or valgus stress.  Negative  Lachman.  Negative pivot shift.  Negative anterior drawer test.  Negative posterior drawer test.  Negative Marshall.  Negative Apley grind.  No patellofemoral crepitations.  No lateral tilting patella.  No patellar apprehension.  No crepitation in the medial and lateral femoral condyle.  No gross motor or sensory deficits.  No intra-articular swelling.  2+ DP and PT pulses. No varus or valgus malalignment.  Skin is intact.  No rashes, scars or lesions.   [de-identified] : Gait: Slightly antalgic to antalgic.  Station: Normal  [de-identified] : Appearance: Well-developed, well-nourished male  in no acute distress.

## 2024-02-07 NOTE — PROCEDURE
[de-identified] : Consent: The risks and benefits of the procedure were discussed with the patient in detail.  Upon verbal consent of the patient, we proceeded with the GenVisc 850 injections as noted below.    Indications: Osteoarthritis, bilateral knees : [employer] NDC#: [address]-94 Lot#:    T-2 Expiration: 03/31/26  Procedure: After sterile prep, the patient underwent a GenVisc 850 injection of 25 mg of sodium hyaluronate in a 2.5 mL syringe into the right and left knee.  The patient tolerated the procedures well.  There were no complications.     Plan: I have recommended ice and elevation.  The patient will be reassessed in one week for the next GenVisc 850 injection for the  osteoarthritis of bilateral knees.

## 2024-02-07 NOTE — HISTORY OF PRESENT ILLNESS
[de-identified] : Dipak comes in today with patellar tendinitis of the left knee.  He states he is doing much better.  He has been going to physical therapy on a consistent basis.  He has gotten much stronger.  He is getting back to his regular activities.

## 2024-02-07 NOTE — PHYSICAL EXAM
[de-identified] : Left knee: Knee: Range of Motion in Degrees	 	                  Claimant:	Normal:	 Flexion Active	  135 	                135-degrees	 Flexion Passive	  135	        135-degrees	 Extension Active	  0-5	                0-5-degrees	 Extension Passive	  0-5	                0-5-degrees	  He does not even have tenderness throughout the patellar tendon.  He just has a lot of weakness in the quad compared to the right quad.  He has significant tightness in the hamstrings.  He can squat, but not fully down at this time.  No evidence of instability in the AP plane or varus or valgus stress.  Negative  Lachman.  Negative pivot shift.  Negative anterior drawer test.  Negative posterior drawer test.  Negative Marshall.  Negative Apley grind.  No patellofemoral crepitations.  No lateral tilting patella.  No patellar apprehension.  No crepitation in the medial and lateral femoral condyle.  No gross motor or sensory deficits.  No intra-articular swelling.  2+ DP and PT pulses. No varus or valgus malalignment.  Skin is intact.  No rashes, scars or lesions.   [de-identified] : Gait: Slightly antalgic to antalgic.  Station: Normal  [de-identified] : Appearance: Well-developed, well-nourished male  in no acute distress.

## 2024-02-07 NOTE — DISCUSSION/SUMMARY
[de-identified] : The patient presents with patellar tendinitis, left knee.  The patient is going to continue physical therapy, try to get back to most of his activities and return back to the office in six to eight weeks.

## 2024-02-07 NOTE — PROCEDURE
[de-identified] : Consent: The risks and benefits of the procedure were discussed with the patient in detail.  Upon verbal consent of the patient, we proceeded with the GenVisc 850 injections as noted below.    Indications: Osteoarthritis, bilateral knees : [employer] NDC#: [address]-11 Lot#:    T-2 Expiration: 03/31/26  Procedure: After sterile prep, the patient underwent a GenVisc 850 injection of 25 mg of sodium hyaluronate in a 2.5 mL syringe into the right and left knee.  The patient tolerated the procedures well.  There were no complications.     Plan: I have recommended ice and elevation.  The patient will be reassessed in one week for the next GenVisc 850 injection for the  osteoarthritis of bilateral knees.

## 2024-02-14 ENCOUNTER — APPOINTMENT (OUTPATIENT)
Dept: ORTHOPEDIC SURGERY | Facility: CLINIC | Age: 69
End: 2024-02-14
Payer: MEDICARE

## 2024-02-14 DIAGNOSIS — M17.12 UNILATERAL PRIMARY OSTEOARTHRITIS, LEFT KNEE: ICD-10-CM

## 2024-02-14 DIAGNOSIS — M17.11 UNILATERAL PRIMARY OSTEOARTHRITIS, RIGHT KNEE: ICD-10-CM

## 2024-02-14 PROCEDURE — 20610 DRAIN/INJ JOINT/BURSA W/O US: CPT | Mod: 50

## 2024-02-15 PROBLEM — M17.11 PRIMARY OSTEOARTHRITIS OF RIGHT KNEE: Status: ACTIVE | Noted: 2019-02-14

## 2024-02-15 PROBLEM — M17.12 PRIMARY OSTEOARTHRITIS OF LEFT KNEE: Status: ACTIVE | Noted: 2019-02-14

## 2024-02-15 NOTE — ADDENDUM
[FreeTextEntry1] : This note was written by Loraine Davis on 02/15/2024 acting as scribe for Vasiliy Matias III, MD

## 2024-02-15 NOTE — PHYSICAL EXAM
[de-identified] : Right Knee:  Knee:  Range of Motion in Degrees                          Claimant: Normal: Flexion Active 135 135-degrees Flexion Passive 135 135-degrees Extension Active 0-5 0-5-degrees Extension Passive 0-5 0-5-degrees  No weakness to flexion/extension. No evidence of instability in the AP plane on varus or valgus stress. Negative Lachman. Negative pivot shift. Negative anterior drawer test. Negative posterior drawer test. Negative Marshall. Negative Apley grind. No medial or lateral joint line tenderness. Positive tenderness over the medial and lateral facet of the patella. Positive patellofemoral crepitations. No lateral tilting patella. No patella apprehension. Positive crepitation in the medial and lateral femoral condyle. No proximal or distal swelling, edema or tenderness. No gross motor or sensory deficits. Mild intra-articular swelling. 2+ DP and PT pulses. No varus or valgus malalignment. Skin is intact. No rashes, scars or lesions.  Left Knee:  Knee:  Range of Motion in Degrees                       Claimant: Normal: Flexion Active 135 135-degrees Flexion Passive 135 135-degrees Extension Active 0-5 0-5-degrees Extension Passive 0-5 0-5-degrees  No weakness to flexion/extension. No evidence of instability in the AP plane on varus or valgus stress. Negative Lachman. Negative pivot shift. Negative anterior drawer test. Negative posterior drawer test. Negative Marshall. Negative Apley grind. No medial or lateral joint line tenderness. Positive tenderness over the medial and lateral facet of the patella. Positive patellofemoral crepitations. No lateral tilting patella. No patella apprehension. Positive crepitation in the medial and lateral femoral condyle. No proximal or distal swelling, edema or tenderness. No gross motor or sensory deficits. Mild intra-articular swelling. 2+ DP and PT pulses. No varus or valgus malalignment. Skin is intact. No rashes, scars or lesions.

## 2024-02-15 NOTE — PROCEDURE
[de-identified] : Indication: Osteoarthritis right knee Osteoarthritis left knee  Consent: The risks and benefits of the procedures were discussed with the patient in detail.  Upon verbal consent of the patient, we proceeded with the GenVisc 850 injections as noted below.    Procedure: After sterile prep, the patient underwent a GenVisc 850 injection of 25 mg of sodium hyaluronate in a 2.5 mL syringe into the right and left knee.  The patient tolerated the procedures well.  There were no complications.    : [employer] NDC#: [address]-03 Lot#:    T-2 Expiration: 03/31/2026  Plan: I have recommended ice and elevation.  The patient will be reassessed in 6-8 weeks for the osteoarthritis of the bilateral knees.

## 2024-03-18 ENCOUNTER — OUTPATIENT (OUTPATIENT)
Dept: OUTPATIENT SERVICES | Facility: HOSPITAL | Age: 69
LOS: 1 days | End: 2024-03-18
Payer: MEDICARE

## 2024-03-18 ENCOUNTER — APPOINTMENT (OUTPATIENT)
Dept: RADIOLOGY | Facility: CLINIC | Age: 69
End: 2024-03-18
Payer: MEDICARE

## 2024-03-18 DIAGNOSIS — Z98.890 OTHER SPECIFIED POSTPROCEDURAL STATES: Chronic | ICD-10-CM

## 2024-03-18 DIAGNOSIS — H26.9 UNSPECIFIED CATARACT: Chronic | ICD-10-CM

## 2024-03-18 DIAGNOSIS — Z00.8 ENCOUNTER FOR OTHER GENERAL EXAMINATION: ICD-10-CM

## 2024-03-18 PROCEDURE — 71046 X-RAY EXAM CHEST 2 VIEWS: CPT | Mod: 26

## 2024-03-18 PROCEDURE — 71046 X-RAY EXAM CHEST 2 VIEWS: CPT

## 2024-03-21 RX ORDER — ONDANSETRON 8 MG/1
4 TABLET, FILM COATED ORAL ONCE
Refills: 0 | Status: DISCONTINUED | OUTPATIENT
Start: 2024-03-22 | End: 2024-03-22

## 2024-03-21 RX ORDER — SODIUM CHLORIDE 9 MG/ML
1000 INJECTION, SOLUTION INTRAVENOUS
Refills: 0 | Status: DISCONTINUED | OUTPATIENT
Start: 2024-03-22 | End: 2024-03-22

## 2024-03-21 RX ORDER — FENTANYL CITRATE 50 UG/ML
50 INJECTION INTRAVENOUS
Refills: 0 | Status: DISCONTINUED | OUTPATIENT
Start: 2024-03-22 | End: 2024-03-22

## 2024-03-22 ENCOUNTER — OUTPATIENT (OUTPATIENT)
Dept: INPATIENT UNIT | Facility: HOSPITAL | Age: 69
LOS: 1 days | Discharge: HOME | End: 2024-03-22
Payer: MEDICARE

## 2024-03-22 ENCOUNTER — TRANSCRIPTION ENCOUNTER (OUTPATIENT)
Age: 69
End: 2024-03-22

## 2024-03-22 VITALS
RESPIRATION RATE: 14 BRPM | TEMPERATURE: 97 F | HEART RATE: 81 BPM | DIASTOLIC BLOOD PRESSURE: 91 MMHG | OXYGEN SATURATION: 97 % | SYSTOLIC BLOOD PRESSURE: 132 MMHG

## 2024-03-22 VITALS
HEIGHT: 68 IN | TEMPERATURE: 98 F | HEART RATE: 111 BPM | SYSTOLIC BLOOD PRESSURE: 101 MMHG | OXYGEN SATURATION: 99 % | WEIGHT: 167.99 LBS | RESPIRATION RATE: 134 BRPM

## 2024-03-22 DIAGNOSIS — M54.51 VERTEBROGENIC LOW BACK PAIN: ICD-10-CM

## 2024-03-22 DIAGNOSIS — Z98.890 OTHER SPECIFIED POSTPROCEDURAL STATES: Chronic | ICD-10-CM

## 2024-03-22 DIAGNOSIS — H26.9 UNSPECIFIED CATARACT: Chronic | ICD-10-CM

## 2024-03-22 LAB — BLD GP AB SCN SERPL QL: SIGNIFICANT CHANGE UP

## 2024-03-22 PROCEDURE — 76000 FLUOROSCOPY <1 HR PHYS/QHP: CPT

## 2024-03-22 PROCEDURE — 36415 COLL VENOUS BLD VENIPUNCTURE: CPT

## 2024-03-22 PROCEDURE — 86850 RBC ANTIBODY SCREEN: CPT

## 2024-03-22 PROCEDURE — 86901 BLOOD TYPING SEROLOGIC RH(D): CPT

## 2024-03-22 PROCEDURE — 93005 ELECTROCARDIOGRAM TRACING: CPT | Mod: XU

## 2024-03-22 PROCEDURE — C1889: CPT

## 2024-03-22 PROCEDURE — 86900 BLOOD TYPING SEROLOGIC ABO: CPT

## 2024-03-22 PROCEDURE — 93010 ELECTROCARDIOGRAM REPORT: CPT

## 2024-03-22 RX ORDER — METOPROLOL TARTRATE 50 MG
1 TABLET ORAL
Qty: 0 | Refills: 0 | DISCHARGE

## 2024-03-22 RX ORDER — OXYCODONE HYDROCHLORIDE 5 MG/1
5 TABLET ORAL ONCE
Refills: 0 | Status: DISCONTINUED | OUTPATIENT
Start: 2024-03-22 | End: 2024-03-22

## 2024-03-22 RX ADMIN — FENTANYL CITRATE 50 MICROGRAM(S): 50 INJECTION INTRAVENOUS at 12:25

## 2024-03-22 RX ADMIN — SODIUM CHLORIDE 100 MILLILITER(S): 9 INJECTION, SOLUTION INTRAVENOUS at 12:24

## 2024-03-22 RX ADMIN — OXYCODONE HYDROCHLORIDE 5 MILLIGRAM(S): 5 TABLET ORAL at 12:25

## 2024-03-22 RX ADMIN — OXYCODONE HYDROCHLORIDE 5 MILLIGRAM(S): 5 TABLET ORAL at 13:29

## 2024-03-22 RX ADMIN — FENTANYL CITRATE 50 MICROGRAM(S): 50 INJECTION INTRAVENOUS at 13:28

## 2024-03-22 NOTE — ASU DISCHARGE PLAN (ADULT/PEDIATRIC) - NS MD DC FALL RISK RISK
For information on Fall & Injury Prevention, visit: [email or web address] OR  [email or web address] OR  https://www.cdc.gov/steadi/patient.html

## 2024-03-22 NOTE — ASU DISCHARGE PLAN (ADULT/PEDIATRIC) - CARE PROVIDER_API CALL
[facility]  Physical/Rehab Medicine  [address] [place] [id number]  Phone: [phone]  Fax: [phone]  Established Patient  Follow Up Time: 1 month

## 2024-03-29 DIAGNOSIS — Z96.653 PRESENCE OF ARTIFICIAL KNEE JOINT, BILATERAL: ICD-10-CM

## 2024-03-29 DIAGNOSIS — Z85.46 PERSONAL HISTORY OF MALIGNANT NEOPLASM OF PROSTATE: ICD-10-CM

## 2024-03-29 DIAGNOSIS — M54.51 VERTEBROGENIC LOW BACK PAIN: ICD-10-CM

## 2024-03-29 DIAGNOSIS — F41.9 ANXIETY DISORDER, UNSPECIFIED: ICD-10-CM

## 2024-03-29 DIAGNOSIS — Z90.49 ACQUIRED ABSENCE OF OTHER SPECIFIED PARTS OF DIGESTIVE TRACT: ICD-10-CM

## 2024-03-29 DIAGNOSIS — I48.91 UNSPECIFIED ATRIAL FIBRILLATION: ICD-10-CM

## 2024-03-29 DIAGNOSIS — I10 ESSENTIAL (PRIMARY) HYPERTENSION: ICD-10-CM

## 2024-05-01 ENCOUNTER — APPOINTMENT (OUTPATIENT)
Dept: DERMATOLOGY | Facility: CLINIC | Age: 69
End: 2024-05-01
Payer: MEDICARE

## 2024-05-01 VITALS — BODY MASS INDEX: 26.52 KG/M2 | WEIGHT: 175 LBS | HEIGHT: 68 IN

## 2024-05-01 DIAGNOSIS — B07.9 VIRAL WART, UNSPECIFIED: ICD-10-CM

## 2024-05-01 PROCEDURE — 99214 OFFICE O/P EST MOD 30 MIN: CPT

## 2024-05-01 NOTE — ASSESSMENT
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

## 2024-07-03 ENCOUNTER — APPOINTMENT (OUTPATIENT)
Dept: DERMATOLOGY | Facility: CLINIC | Age: 69
End: 2024-07-03
Payer: MEDICARE

## 2024-07-03 DIAGNOSIS — B07.9 VIRAL WART, UNSPECIFIED: ICD-10-CM

## 2024-07-03 PROCEDURE — 17110 DESTRUCTION B9 LES UP TO 14: CPT

## 2024-07-31 NOTE — ED STATDOCS - PROGRESS NOTE DETAILS
Winlevi Pregnancy And Lactation Text: This medication is considered safe during pregnancy and breastfeeding. Minocycline Pregnancy And Lactation Text: This medication is Pregnancy Category D and not consider safe during pregnancy. It is also excreted in breast milk. Topical Sulfur Applications Pregnancy And Lactation Text: This medication is Pregnancy Category C and has an unknown safety profile during pregnancy. It is unknown if this topical medication is excreted in breast milk. Aklief counseling:  Patient advised to apply a pea-sized amount only at bedtime and wait 30 minutes after washing their face before applying.  If too drying, patient may add a non-comedogenic moisturizer.  The most commonly reported side effects including irritation, redness, scaling, dryness, stinging, burning, itching, and increased risk of sunburn.  The patient verbalized understanding of the proper use and possible adverse effects of retinoids.  All of the patient's questions and concerns were addressed. Spironolactone Pregnancy And Lactation Text: This medication can cause feminization of the male fetus and should be avoided during pregnancy. The active metabolite is also found in breast milk. Minocycline Counseling: Patient advised regarding possible photosensitivity and discoloration of the teeth, skin, lips, tongue and gums.  Patient instructed to avoid sunlight, if possible.  When exposed to sunlight, patients should wear protective clothing, sunglasses, and sunscreen.  The patient was instructed to call the office immediately if the following severe adverse effects occur:  hearing changes, easy bruising/bleeding, severe headache, or vision changes.  The patient verbalized understanding of the proper use and possible adverse effects of minocycline.  All of the patient's questions and concerns were addressed. Benzoyl Peroxide Counseling: Patient counseled that medicine may cause skin irritation and bleach clothing.  In the event of skin irritation, the patient was advised to reduce the amount of the drug applied or use it less frequently.   The patient verbalized understanding of the proper use and possible adverse effects of benzoyl peroxide.  All of the patient's questions and concerns were addressed. Topical Clindamycin Pregnancy And Lactation Text: This medication is Pregnancy Category B and is considered safe during pregnancy. It is unknown if it is excreted in breast milk. Spironolactone Counseling: Patient advised regarding risks of diarrhea, abdominal pain, hyperkalemia, birth defects (for female patients), liver toxicity and renal toxicity. The patient may need blood work to monitor liver and kidney function and potassium levels while on therapy. The patient verbalized understanding of the proper use and possible adverse effects of spironolactone.  All of the patient's questions and concerns were addressed. Winlevi Counseling:  I discussed with the patient the risks of topical clascoterone including but not limited to erythema, scaling, itching, and stinging. Patient voiced their understanding. Bactrim Pregnancy And Lactation Text: This medication is Pregnancy Category D and is known to cause fetal risk.  It is also excreted in breast milk. Tazorac Counseling:  Patient advised that medication is irritating and drying.  Patient may need to apply sparingly and wash off after an hour before eventually leaving it on overnight.  The patient verbalized understanding of the proper use and possible adverse effects of tazorac.  All of the patient's questions and concerns were addressed. Dapsone Pregnancy And Lactation Text: This medication is Pregnancy Category C and is not considered safe during pregnancy or breast feeding. Birth Control Pills Counseling: Birth Control Pill Counseling: I discussed with the patient the potential side effects of OCPs including but not limited to increased risk of stroke, heart attack, thrombophlebitis, deep venous thrombosis, hepatic adenomas, breast changes, GI upset, headaches, and depression.  The patient verbalized understanding of the proper use and possible adverse effects of OCPs. All of the patient's questions and concerns were addressed. Use Enhanced Medication Counseling?: No Isotretinoin Counseling: Patient should get monthly blood tests, not donate blood, not drive at night if vision affected, not share medication, and not undergo elective surgery for 6 months after tx completed. Side effects reviewed, pt to contact office should one occur. Topical Sulfur Applications Counseling: Topical Sulfur Counseling: Patient counseled that this medication may cause skin irritation or allergic reactions.  In the event of skin irritation, the patient was advised to reduce the amount of the drug applied or use it less frequently.   The patient verbalized understanding of the proper use and possible adverse effects of topical sulfur application.  All of the patient's questions and concerns were addressed. Azithromycin Counseling:  I discussed with the patient the risks of azithromycin including but not limited to GI upset, allergic reaction, drug rash, diarrhea, and yeast infections. Dapsone Counseling: I discussed with the patient the risks of dapsone including but not limited to hemolytic anemia, agranulocytosis, rashes, methemoglobinemia, kidney failure, peripheral neuropathy, headaches, GI upset, and liver toxicity.  Patients who start dapsone require monitoring including baseline LFTs and weekly CBCs for the first month, then every month thereafter.  The patient verbalized understanding of the proper use and possible adverse effects of dapsone.  All of the patient's questions and concerns were addressed. Azelaic Acid Counseling: Patient counseled that medicine may cause skin irritation and to avoid applying near the eyes.  In the event of skin irritation, the patient was advised to reduce the amount of the drug applied or use it less frequently.   The patient verbalized understanding of the proper use and possible adverse effects of azelaic acid.  All of the patient's questions and concerns were addressed. Erythromycin Pregnancy And Lactation Text: This medication is Pregnancy Category B and is considered safe during pregnancy. It is also excreted in breast milk. Isotretinoin Pregnancy And Lactation Text: This medication is Pregnancy Category X and is considered extremely dangerous during pregnancy. It is unknown if it is excreted in breast milk. Topical Clindamycin Counseling: Patient counseled that this medication may cause skin irritation or allergic reactions.  In the event of skin irritation, the patient was advised to reduce the amount of the drug applied or use it less frequently.   The patient verbalized understanding of the proper use and possible adverse effects of clindamycin.  All of the patient's questions and concerns were addressed. Tazorac Pregnancy And Lactation Text: This medication is not safe during pregnancy. It is unknown if this medication is excreted in breast milk. Bactrim Counseling:  I discussed with the patient the risks of sulfa antibiotics including but not limited to GI upset, allergic reaction, drug rash, diarrhea, dizziness, photosensitivity, and yeast infections.  Rarely, more serious reactions can occur including but not limited to aplastic anemia, agranulocytosis, methemoglobinemia, blood dyscrasias, liver or kidney failure, lung infiltrates or desquamative/blistering drug rashes. Birth Control Pills Pregnancy And Lactation Text: This medication should be avoided if pregnant and for the first 30 days post-partum. Benzoyl Peroxide Pregnancy And Lactation Text: This medication is Pregnancy Category C. It is unknown if benzoyl peroxide is excreted in breast milk. Azelaic Acid Pregnancy And Lactation Text: This medication is considered safe during pregnancy and breast feeding. High Dose Vitamin A Counseling: Side effects reviewed, pt to contact office should one occur. Sarecycline Counseling: Patient advised regarding possible photosensitivity and discoloration of the teeth, skin, lips, tongue and gums.  Patient instructed to avoid sunlight, if possible.  When exposed to sunlight, patients should wear protective clothing, sunglasses, and sunscreen.  The patient was instructed to call the office immediately if the following severe adverse effects occur:  hearing changes, easy bruising/bleeding, severe headache, or vision changes.  The patient verbalized understanding of the proper use and possible adverse effects of sarecycline.  All of the patient's questions and concerns were addressed. Tetracycline Counseling: Patient counseled regarding possible photosensitivity and increased risk for sunburn.  Patient instructed to avoid sunlight, if possible.  When exposed to sunlight, patients should wear protective clothing, sunglasses, and sunscreen.  The patient was instructed to call the office immediately if the following severe adverse effects occur:  hearing changes, easy bruising/bleeding, severe headache, or vision changes.  The patient verbalized understanding of the proper use and possible adverse effects of tetracycline.  All of the patient's questions and concerns were addressed. Patient understands to avoid pregnancy while on therapy due to potential birth defects. Azithromycin Pregnancy And Lactation Text: This medication is considered safe during pregnancy and is also secreted in breast milk. Doxycycline Pregnancy And Lactation Text: This medication is Pregnancy Category D and not consider safe during pregnancy. It is also excreted in breast milk but is considered safe for shorter treatment courses. Topical Retinoid Pregnancy And Lactation Text: This medication is Pregnancy Category C. It is unknown if this medication is excreted in breast milk. Aklief Pregnancy And Lactation Text: It is unknown if this medication is safe to use during pregnancy.  It is unknown if this medication is excreted in breast milk.  Breastfeeding women should use the topical cream on the smallest area of the skin for the shortest time needed while breastfeeding.  Do not apply to nipple and areola. Topical Retinoid counseling:  Patient advised to apply a pea-sized amount only at bedtime and wait 30 minutes after washing their face before applying.  If too drying, patient may add a non-comedogenic moisturizer. The patient verbalized understanding of the proper use and possible adverse effects of retinoids.  All of the patient's questions and concerns were addressed. Detail Level: Zone Doxycycline Counseling:  Patient counseled regarding possible photosensitivity and increased risk for sunburn.  Patient instructed to avoid sunlight, if possible.  When exposed to sunlight, patients should wear protective clothing, sunglasses, and sunscreen.  The patient was instructed to call the office immediately if the following severe adverse effects occur:  hearing changes, easy bruising/bleeding, severe headache, or vision changes.  The patient verbalized understanding of the proper use and possible adverse effects of doxycycline.  All of the patient's questions and concerns were addressed. High Dose Vitamin A Pregnancy And Lactation Text: High dose vitamin A therapy is contraindicated during pregnancy and breast feeding. Erythromycin Counseling:  I discussed with the patient the risks of erythromycin including but not limited to GI upset, allergic reaction, drug rash, diarrhea, increase in liver enzymes, and yeast infections. Patient seen and evaluated, ED attending note and orders reviewed, will continue with patient follow up and care -Shi Aguirre PA-C labs with mildly elevated lfts, mild anemia, other labs WNL, CTA head and neck with no acute findings, no thrombosis, aneurysm, called and d/w PMD Dr. Coughlin, pt stable for dc home, pt states he is feeling well, will dc home with outpt f/u return precautions given  Shi Aguirre PA-C

## 2024-09-11 ENCOUNTER — APPOINTMENT (OUTPATIENT)
Dept: DERMATOLOGY | Facility: CLINIC | Age: 69
End: 2024-09-11
Payer: MEDICARE

## 2024-09-11 DIAGNOSIS — B07.9 VIRAL WART, UNSPECIFIED: ICD-10-CM

## 2024-09-11 PROCEDURE — 17110 DESTRUCTION B9 LES UP TO 14: CPT

## 2024-09-11 NOTE — ASSESSMENT
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

## 2024-09-11 NOTE — HISTORY OF PRESENT ILLNESS
[de-identified] : Pt. c/o persistent wart on right thumb; Had cryo in 5/2024;  but recurred;  better response to tx in 7/2024, using ClearAway at home with good results

## 2024-12-20 ENCOUNTER — APPOINTMENT (OUTPATIENT)
Dept: DERMATOLOGY | Facility: CLINIC | Age: 69
End: 2024-12-20